# Patient Record
Sex: FEMALE | Race: WHITE | NOT HISPANIC OR LATINO | URBAN - METROPOLITAN AREA
[De-identification: names, ages, dates, MRNs, and addresses within clinical notes are randomized per-mention and may not be internally consistent; named-entity substitution may affect disease eponyms.]

---

## 2018-12-03 ENCOUNTER — INPATIENT (INPATIENT)
Facility: HOSPITAL | Age: 58
LOS: 9 days | Discharge: HOME HEALTH | End: 2018-12-13
Attending: INTERNAL MEDICINE | Admitting: INTERNAL MEDICINE

## 2018-12-03 VITALS
RESPIRATION RATE: 22 BRPM | OXYGEN SATURATION: 96 % | HEIGHT: 70 IN | TEMPERATURE: 100 F | SYSTOLIC BLOOD PRESSURE: 134 MMHG | HEART RATE: 74 BPM | WEIGHT: 251.99 LBS | DIASTOLIC BLOOD PRESSURE: 76 MMHG

## 2018-12-03 DIAGNOSIS — Z98.890 OTHER SPECIFIED POSTPROCEDURAL STATES: Chronic | ICD-10-CM

## 2018-12-03 DIAGNOSIS — R06.02 SHORTNESS OF BREATH: ICD-10-CM

## 2018-12-03 DIAGNOSIS — J18.9 PNEUMONIA, UNSPECIFIED ORGANISM: ICD-10-CM

## 2018-12-03 DIAGNOSIS — R07.89 OTHER CHEST PAIN: ICD-10-CM

## 2018-12-03 DIAGNOSIS — J13 PNEUMONIA DUE TO STREPTOCOCCUS PNEUMONIAE: ICD-10-CM

## 2018-12-03 LAB
ALBUMIN SERPL ELPH-MCNC: 4.2 G/DL — SIGNIFICANT CHANGE UP (ref 3.5–5.2)
ALP SERPL-CCNC: 65 U/L — SIGNIFICANT CHANGE UP (ref 30–115)
ALT FLD-CCNC: 17 U/L — SIGNIFICANT CHANGE UP (ref 0–41)
ANION GAP SERPL CALC-SCNC: 17 MMOL/L — HIGH (ref 7–14)
AST SERPL-CCNC: 20 U/L — SIGNIFICANT CHANGE UP (ref 0–41)
BASOPHILS # BLD AUTO: 0.04 K/UL — SIGNIFICANT CHANGE UP (ref 0–0.2)
BASOPHILS NFR BLD AUTO: 0.3 % — SIGNIFICANT CHANGE UP (ref 0–1)
BILIRUB SERPL-MCNC: 0.9 MG/DL — SIGNIFICANT CHANGE UP (ref 0.2–1.2)
BUN SERPL-MCNC: 9 MG/DL — LOW (ref 10–20)
CALCIUM SERPL-MCNC: 9.5 MG/DL — SIGNIFICANT CHANGE UP (ref 8.5–10.1)
CHLORIDE SERPL-SCNC: 100 MMOL/L — SIGNIFICANT CHANGE UP (ref 98–110)
CK SERPL-CCNC: 37 U/L — SIGNIFICANT CHANGE UP (ref 0–225)
CO2 SERPL-SCNC: 22 MMOL/L — SIGNIFICANT CHANGE UP (ref 17–32)
CREAT SERPL-MCNC: 0.8 MG/DL — SIGNIFICANT CHANGE UP (ref 0.7–1.5)
D DIMER BLD IA.RAPID-MCNC: 197 NG/ML DDU — SIGNIFICANT CHANGE UP (ref 0–230)
EOSINOPHIL # BLD AUTO: 0.04 K/UL — SIGNIFICANT CHANGE UP (ref 0–0.7)
EOSINOPHIL NFR BLD AUTO: 0.3 % — SIGNIFICANT CHANGE UP (ref 0–8)
FLU A RESULT: NEGATIVE — SIGNIFICANT CHANGE UP
FLU A RESULT: NEGATIVE — SIGNIFICANT CHANGE UP
FLUAV AG NPH QL: NEGATIVE — SIGNIFICANT CHANGE UP
FLUBV AG NPH QL: NEGATIVE — SIGNIFICANT CHANGE UP
GLUCOSE SERPL-MCNC: 156 MG/DL — HIGH (ref 70–99)
HCT VFR BLD CALC: 43.1 % — SIGNIFICANT CHANGE UP (ref 37–47)
HGB BLD-MCNC: 14.2 G/DL — SIGNIFICANT CHANGE UP (ref 12–16)
IMM GRANULOCYTES NFR BLD AUTO: 0.3 % — SIGNIFICANT CHANGE UP (ref 0.1–0.3)
LYMPHOCYTES # BLD AUTO: 1.36 K/UL — SIGNIFICANT CHANGE UP (ref 1.2–3.4)
LYMPHOCYTES # BLD AUTO: 9.5 % — LOW (ref 20.5–51.1)
MCHC RBC-ENTMCNC: 25.8 PG — LOW (ref 27–31)
MCHC RBC-ENTMCNC: 32.9 G/DL — SIGNIFICANT CHANGE UP (ref 32–37)
MCV RBC AUTO: 78.2 FL — LOW (ref 81–99)
MONOCYTES # BLD AUTO: 1.33 K/UL — HIGH (ref 0.1–0.6)
MONOCYTES NFR BLD AUTO: 9.2 % — SIGNIFICANT CHANGE UP (ref 1.7–9.3)
NEUTROPHILS # BLD AUTO: 11.57 K/UL — HIGH (ref 1.4–6.5)
NEUTROPHILS NFR BLD AUTO: 80.4 % — HIGH (ref 42.2–75.2)
NRBC # BLD: 0 /100 WBCS — SIGNIFICANT CHANGE UP (ref 0–0)
NT-PROBNP SERPL-SCNC: 105 PG/ML — SIGNIFICANT CHANGE UP (ref 0–300)
PLATELET # BLD AUTO: 243 K/UL — SIGNIFICANT CHANGE UP (ref 130–400)
POTASSIUM SERPL-MCNC: 4.2 MMOL/L — SIGNIFICANT CHANGE UP (ref 3.5–5)
POTASSIUM SERPL-SCNC: 4.2 MMOL/L — SIGNIFICANT CHANGE UP (ref 3.5–5)
PROT SERPL-MCNC: 7.3 G/DL — SIGNIFICANT CHANGE UP (ref 6–8)
RBC # BLD: 5.51 M/UL — HIGH (ref 4.2–5.4)
RBC # FLD: 13 % — SIGNIFICANT CHANGE UP (ref 11.5–14.5)
RSV RESULT: NEGATIVE — SIGNIFICANT CHANGE UP
RSV RNA RESP QL NAA+PROBE: NEGATIVE — SIGNIFICANT CHANGE UP
SODIUM SERPL-SCNC: 139 MMOL/L — SIGNIFICANT CHANGE UP (ref 135–146)
TROPONIN T SERPL-MCNC: <0.01 NG/ML — SIGNIFICANT CHANGE UP
TROPONIN T SERPL-MCNC: <0.01 NG/ML — SIGNIFICANT CHANGE UP
WBC # BLD: 14.38 K/UL — HIGH (ref 4.8–10.8)
WBC # FLD AUTO: 14.38 K/UL — HIGH (ref 4.8–10.8)

## 2018-12-03 RX ORDER — ASPIRIN/CALCIUM CARB/MAGNESIUM 324 MG
325 TABLET ORAL DAILY
Qty: 0 | Refills: 0 | Status: DISCONTINUED | OUTPATIENT
Start: 2018-12-03 | End: 2018-12-13

## 2018-12-03 RX ORDER — ENOXAPARIN SODIUM 100 MG/ML
40 INJECTION SUBCUTANEOUS DAILY
Qty: 0 | Refills: 0 | Status: DISCONTINUED | OUTPATIENT
Start: 2018-12-03 | End: 2018-12-13

## 2018-12-03 RX ORDER — ASPIRIN/CALCIUM CARB/MAGNESIUM 324 MG
325 TABLET ORAL ONCE
Qty: 0 | Refills: 0 | Status: COMPLETED | OUTPATIENT
Start: 2018-12-03 | End: 2018-12-03

## 2018-12-03 RX ADMIN — Medication 325 MILLIGRAM(S): at 13:04

## 2018-12-03 NOTE — ED PROVIDER NOTE - ATTENDING CONTRIBUTION TO CARE
57 yo f with pmh presents with DIALLO, sob for the past 3 days.  pt with mild symptoms over the past few weeks that acutely worsened over few days.  + mild cough.  no abd pain, no back pain, no headache, no leg pain, no leg swelling.  no dizziness.  awake, alert.  neck supple.  lungs clear.  abd soft, nontender.  no calf tenderness, no LE edema.  mmm.  a/p:  diallo.  p:  labs, ekg, cxr, likely admission to tele.

## 2018-12-03 NOTE — H&P ADULT - NSHPPHYSICALEXAM_GEN_ALL_CORE
Vital Signs Last 24 Hrs  T(C): 36.4 (12-03-18 @ 16:34)  T(F): 97.6 (12-03-18 @ 16:34), Max: 100.2 (12-03-18 @ 11:44)  HR: 56 (12-03-18 @ 16:34) (56 - 74)  BP: 128/67 (12-03-18 @ 16:34)  BP(mean): --  RR: 16 (12-03-18 @ 16:34) (16 - 22)  SpO2: 95% (12-03-18 @ 16:34) (95% - 96%)  Wt(kg): --

## 2018-12-03 NOTE — ED PROVIDER NOTE - OBJECTIVE STATEMENT
The patient is a 58y Female with no significant PMH presenting to ED with worsening shortness of breath x 3 days. Patient states she's noticed some shortness of breath with exertion over the past 3wks but within the last 3 days it has worsened where she cannot walk up one flight of stairs without becoming short of breath. She also notes chest tightness The patient is a 58y Female with no significant PMH presenting to ED with worsening shortness of breath x 3 days. Patient states she's noticed some shortness of breath with exertion over the past 3wks but within the last 3 days it has worsened where she cannot walk up one flight of stairs without becoming short of breath. She also notes chest tightness over the past 3 days with exertion. She endorses a cough and a fever yesterday of 101F. She has not taken any medications today. She denies abdominal pain, n/v/d, and urinary changes. The patient is a 58y Female with no significant PMH presenting to ED with worsening shortness of breath x 3 days. Patient states she's noticed some shortness of breath with exertion over the past 3wks but within the last 3 days it has worsened where she cannot walk up one flight of stairs without becoming short of breath. She also notes non-radiating chest tightness over the past 3 days with exertion. She endorses a semi-productive cough and a fever yesterday of 101F. She has not taken any medications today. She denies abdominal pain, n/v/d, urinary changes, recent travel, leg swelling, calf pain and hormone use. Patient states she has never had a stress test and/or ECHO. The patient is a 58y Female with no significant PMH presenting to ED with worsening shortness of breath x 3 days. Patient states she's noticed some shortness of breath with exertion over the past 3wks but within the last 3 days it has worsened where she cannot walk up one flight of stairs without becoming short of breath. She also notes non-radiating chest tightness over the past 3 days with exertion. She endorses a semi-productive cough and a fever yesterday of 101F. She has not taken any medications today. She denies abdominal pain, n/v/d, urinary changes, recent travel, leg swelling, calf pain and hormone use. Patient states she has never had a stress test and/or ECHO. FHx (+): Mom had MI with CABG at age 70.

## 2018-12-03 NOTE — ED PROVIDER NOTE - PHYSICAL EXAMINATION
GEN: Alert & Oriented x 3, No acute distress. Calm, appropriate.  Head and Neck: No cervical lymphadenopathy.   Eyes: PERRL. No conjunctival injection. No scleral icterus. Vision 20/20  RESP: Lungs clear to auscult bilat. no wheezes, rhonchi or rales. No retractions. Equal air entry. No tachypnea.   CARDIO: regular rate and rhythm, no murmurs, rubs or gallops. Normal S1, S2. Radial pulses 2+ bilaterally. No lower extremity edema.  ABD: Soft, slight distension. No rebound tenderness/guarding. No pulsatile mass. No tenderness with light and deep palpation x 4 quadrants  MS: no leg swelling. No calf tenderness.  SKIN: no rashes/lesions, no petechiae.  NEURO: CN II-XII grossly intact. Speech and cognition normal.

## 2018-12-03 NOTE — ED PROVIDER NOTE - PROGRESS NOTE DETAILS
supervised care of this patient Spoke with patient about xray results, lab results and need for admission. Patient agrees with plan and no questions at this time. Spoke with Dr. Siegel will admit to non-ccu tele.

## 2018-12-03 NOTE — ED PROVIDER NOTE - MEDICAL DECISION MAKING DETAILS
Pt with sob, cough, DIALLO.  CXR with opacity.  pt given abx, aspirin.  pt admitted to low risk [facility] for Chest pressure and DIALLO.  pt given abx for concern for pneumonia.

## 2018-12-03 NOTE — H&P ADULT - HISTORY OF PRESENT ILLNESS
The patient is a 58y Female with no significant PMH presenting to ED with worsening shortness of breath x 3 days. Patient states she's noticed some shortness of breath with exertion over the past 3wks but within the last 3 days it has worsened where she cannot walk up one flight of stairs without becoming short of breath. She also notes non-radiating chest tightness over the past 3 days with exertion. She endorses a semi-productive cough and a fever yesterday of 101F. She has not taken any medications today. She denies abdominal pain, n/v/d, urinary changes, recent travel, leg swelling, calf pain and hormone use. Patient states she has never had a stress test and/or ECHO. FHx (+): Mom had MI with CABG at age 70.

## 2018-12-03 NOTE — H&P ADULT - NSHPLABSRESULTS_GEN_ALL_CORE
14.2   14.38 )-----------( 243      ( 03 Dec 2018 12:32 )             43.1       12-03    139  |  100  |  9<L>  ----------------------------<  156<H>  4.2   |  22  |  0.8    Ca    9.5      03 Dec 2018 12:32    TPro  7.3  /  Alb  4.2  /  TBili  0.9  /  DBili  x   /  AST  20  /  ALT  17  /  AlkPhos  65  12-03                      Lactate Trend      CARDIAC MARKERS ( 03 Dec 2018 12:32 )  x     / <0.01 ng/mL / x     / x     / x            CAPILLARY BLOOD GLUCOSE

## 2018-12-03 NOTE — ED PROVIDER NOTE - NS ED ROS FT
GEN: (+) fever, (-)chills, (+) malaise  HEENT: (-) vision changes, (-) HA, (-) sore throat, (-) ear pain, (-) epistaxis , (-) tinnitus   CV: (+) chest tightness, (-) palpitations, (-) edema  PULM: (+) cough, (-) wheezing, (+) shortness of breath, (-) orthopnea, (-) hemoptysis   GI: (-) abdominal pain,(-) Nausea, (-) Vomiting, (-) Diarrhea, (-) Melena  NEURO: (-) weakness, (-) paresthesias, (-) syncope,  : (-) dysuria, (-) frequency, (-) urgency,  MS: (-) back pain, (-) joint pain, (-)myalgias,  SKIN: (-) rashes, (-) new lesions, (-) pruritus, (-) jaundice  HEME: (-) bleeding, (-) ecchymosis

## 2018-12-03 NOTE — ED PROVIDER NOTE - CARE PLAN
Principal Discharge DX:	Pneumonia  Secondary Diagnosis:	Chest tightness  Secondary Diagnosis:	Shortness of breath

## 2018-12-04 LAB
ANION GAP SERPL CALC-SCNC: 15 MMOL/L — HIGH (ref 7–14)
BUN SERPL-MCNC: 10 MG/DL — SIGNIFICANT CHANGE UP (ref 10–20)
CALCIUM SERPL-MCNC: 9.3 MG/DL — SIGNIFICANT CHANGE UP (ref 8.5–10.1)
CHLORIDE SERPL-SCNC: 101 MMOL/L — SIGNIFICANT CHANGE UP (ref 98–110)
CK MB CFR SERPL CALC: <1 NG/ML — SIGNIFICANT CHANGE UP (ref 0.6–6.3)
CK SERPL-CCNC: 25 U/L — SIGNIFICANT CHANGE UP (ref 0–225)
CO2 SERPL-SCNC: 23 MMOL/L — SIGNIFICANT CHANGE UP (ref 17–32)
CREAT SERPL-MCNC: 0.9 MG/DL — SIGNIFICANT CHANGE UP (ref 0.7–1.5)
GLUCOSE SERPL-MCNC: 176 MG/DL — HIGH (ref 70–99)
HCT VFR BLD CALC: 41.5 % — SIGNIFICANT CHANGE UP (ref 37–47)
HGB BLD-MCNC: 13.5 G/DL — SIGNIFICANT CHANGE UP (ref 12–16)
MCHC RBC-ENTMCNC: 25.6 PG — LOW (ref 27–31)
MCHC RBC-ENTMCNC: 32.5 G/DL — SIGNIFICANT CHANGE UP (ref 32–37)
MCV RBC AUTO: 78.6 FL — LOW (ref 81–99)
NRBC # BLD: 0 /100 WBCS — SIGNIFICANT CHANGE UP (ref 0–0)
PLATELET # BLD AUTO: 312 K/UL — SIGNIFICANT CHANGE UP (ref 130–400)
POTASSIUM SERPL-MCNC: 4.2 MMOL/L — SIGNIFICANT CHANGE UP (ref 3.5–5)
POTASSIUM SERPL-SCNC: 4.2 MMOL/L — SIGNIFICANT CHANGE UP (ref 3.5–5)
RBC # BLD: 5.28 M/UL — SIGNIFICANT CHANGE UP (ref 4.2–5.4)
RBC # FLD: 13.2 % — SIGNIFICANT CHANGE UP (ref 11.5–14.5)
SODIUM SERPL-SCNC: 139 MMOL/L — SIGNIFICANT CHANGE UP (ref 135–146)
TROPONIN T SERPL-MCNC: <0.01 NG/ML — SIGNIFICANT CHANGE UP
WBC # BLD: 13.98 K/UL — HIGH (ref 4.8–10.8)
WBC # FLD AUTO: 13.98 K/UL — HIGH (ref 4.8–10.8)

## 2018-12-04 RX ORDER — CHLORHEXIDINE GLUCONATE 213 G/1000ML
1 SOLUTION TOPICAL
Qty: 0 | Refills: 0 | Status: DISCONTINUED | OUTPATIENT
Start: 2018-12-04 | End: 2018-12-13

## 2018-12-04 RX ADMIN — Medication 600 MILLIGRAM(S): at 14:15

## 2018-12-04 RX ADMIN — Medication 325 MILLIGRAM(S): at 14:08

## 2018-12-05 RX ADMIN — ENOXAPARIN SODIUM 40 MILLIGRAM(S): 100 INJECTION SUBCUTANEOUS at 13:53

## 2018-12-05 RX ADMIN — Medication 30 MILLILITER(S): at 01:38

## 2018-12-05 RX ADMIN — Medication 325 MILLIGRAM(S): at 13:52

## 2018-12-05 RX ADMIN — Medication 600 MILLIGRAM(S): at 01:27

## 2018-12-05 RX ADMIN — Medication 600 MILLIGRAM(S): at 18:16

## 2018-12-05 RX ADMIN — Medication 30 MILLILITER(S): at 21:02

## 2018-12-05 NOTE — PROGRESS NOTE ADULT - SUBJECTIVE AND OBJECTIVE BOX
LUZ HUGHES  58y  Female    Patient is a 58y old  Female who presents with a chief complaint of 58 YEARS OLD FEMALE C/O SOB . (05 Dec 2018 06:28)    ALLERGIES:  penicillin (Rash)      INTERVAL HPI/OVERNIGHT EVENTS:    VITALS:  T(F): 96.6 (12-05-18 @ 14:03), Max: 99 (12-05-18 @ 05:26)  HR: 51 (12-05-18 @ 14:03) (51 - 70)  BP: 117/62 (12-05-18 @ 14:03) (114/58 - 129/77)  RR: 16 (12-05-18 @ 14:03) (16 - 17)  SpO2: 95% (12-05-18 @ 07:56) (95% - 95%)    LABS:  12-04    139  |  101  |  10  ----------------------------<  176<H>  4.2   |  23  |  0.9    Ca    9.3      04 Dec 2018 07:03      MICROBIOLOGY:    MEDICATION:  aluminum hydroxide/magnesium hydroxide/simethicone Suspension 30 milliLiter(s) Oral every 6 hours PRN  aspirin 325 milliGRAM(s) Oral daily  chlorhexidine 4% Liquid 1 Application(s) Topical <User Schedule> PRN  enoxaparin Injectable 40 milliGRAM(s) SubCutaneous daily  guaiFENesin  milliGRAM(s) Oral every 12 hours  levoFLOXacin IVPB 500 milliGRAM(s) IV Intermittent every 24 hours    RADIOLOGY & ADDITIONAL TESTS:

## 2018-12-05 NOTE — PROGRESS NOTE ADULT - ASSESSMENT
IMPRESSION:  59 yo female non smoker with no significant PMH presenting with a 3 week history of exertional dyspnea, recent onset of cough and fever to 101 with elevated WBC and bibasilar opacities on CXR   Findings consistent with community acquired pneumonia    PLAN:---------------------------------------------------------------  Monitor O2 sats  Continue Levaquin  Follow cultures  Check urine legionella antigen  DVT prophylaxis  OOB  Monitor labs  repeat CXR in 4 - 6 weeks unless indicated sooner based on clinical worssening  still hypoxic  pulmonary inputs appreciated

## 2018-12-06 LAB
HCT VFR BLD CALC: 40.2 % — SIGNIFICANT CHANGE UP (ref 37–47)
HGB BLD-MCNC: 13.3 G/DL — SIGNIFICANT CHANGE UP (ref 12–16)
LEGIONELLA AG UR QL: NEGATIVE — SIGNIFICANT CHANGE UP
MCHC RBC-ENTMCNC: 26.2 PG — LOW (ref 27–31)
MCHC RBC-ENTMCNC: 33.1 G/DL — SIGNIFICANT CHANGE UP (ref 32–37)
MCV RBC AUTO: 79.1 FL — LOW (ref 81–99)
NRBC # BLD: 0 /100 WBCS — SIGNIFICANT CHANGE UP (ref 0–0)
PLATELET # BLD AUTO: 341 K/UL — SIGNIFICANT CHANGE UP (ref 130–400)
RBC # BLD: 5.08 M/UL — SIGNIFICANT CHANGE UP (ref 4.2–5.4)
RBC # FLD: 13 % — SIGNIFICANT CHANGE UP (ref 11.5–14.5)
WBC # BLD: 10.69 K/UL — SIGNIFICANT CHANGE UP (ref 4.8–10.8)
WBC # FLD AUTO: 10.69 K/UL — SIGNIFICANT CHANGE UP (ref 4.8–10.8)

## 2018-12-06 RX ORDER — IPRATROPIUM/ALBUTEROL SULFATE 18-103MCG
3 AEROSOL WITH ADAPTER (GRAM) INHALATION EVERY 6 HOURS
Qty: 0 | Refills: 0 | Status: DISCONTINUED | OUTPATIENT
Start: 2018-12-06 | End: 2018-12-07

## 2018-12-06 RX ADMIN — ENOXAPARIN SODIUM 40 MILLIGRAM(S): 100 INJECTION SUBCUTANEOUS at 12:17

## 2018-12-06 RX ADMIN — Medication 600 MILLIGRAM(S): at 20:27

## 2018-12-06 RX ADMIN — Medication 3 MILLILITER(S): at 21:25

## 2018-12-06 RX ADMIN — Medication 325 MILLIGRAM(S): at 12:17

## 2018-12-06 RX ADMIN — Medication 600 MILLIGRAM(S): at 08:04

## 2018-12-06 NOTE — CONSULT NOTE ADULT - SUBJECTIVE AND OBJECTIVE BOX
Patient is a 58y old female who presents with a chief complaint of 58 YEARS OLD FEMALE C/O SOB . (03 Dec 2018 20:16)      HPI:  The patient is a 58y Female with no significant PMH presenting to ED with worsening shortness of breath x 3 days.   Patient states she's noticed some shortness of breath with exertion over the past 3wks but within the last 3 days it has worsened where she cannot walk up one flight of stairs without becoming short of breath. She also notes non-radiating chest tightness over the past 3 days with exertion. She endorses a semi-productive cough and a fever yesterday of 101F. She has not taken any medications today. She denies abdominal pain, n/v/d, urinary changes, recent travel, leg swelling, calf pain and hormone use. Patient states she has never had a stress test and/or ECHO. FHx (+): Mom had MI with CABG at age 70. (03 Dec 2018 20:16)    PAST MEDICAL & SURGICAL HISTORY:  No pertinent past medical history  History of dilatation and curettage    Smoking History:  Non smoker    Review of Systems:  Dyspnea, cough, minimal sputum, fever  No HA, sinus symptoms, sore throat  No myalgias, rashes, edema, orthopnea  No GI or  symptoms    Allergies:  penicillin (Rash)    MEDICATIONS  (STANDING):  aspirin 325 milliGRAM(s) Oral daily  enoxaparin Injectable 40 milliGRAM(s) SubCutaneous daily  guaiFENesin  milliGRAM(s) Oral every 12 hours  levoFLOXacin IVPB 500 milliGRAM(s) IV Intermittent every 24 hours    MEDICATIONS  (PRN):  aluminum hydroxide/magnesium hydroxide/simethicone Suspension 30 milliLiter(s) Oral every 6 hours PRN Dyspepsia  chlorhexidine 4% Liquid 1 Application(s) Topical <User Schedule> PRN infection control    PHYSICAL EXAM  Vital Signs Last 24 Hrs  T(C): 37.2 (05 Dec 2018 05:26), Max: 37.2 (05 Dec 2018 05:26)  T(F): 99 (05 Dec 2018 05:26), Max: 99 (05 Dec 2018 05:26)  HR: 70 (05 Dec 2018 05:26) (54 - 70)  BP: 129/77 (05 Dec 2018 05:26) (114/58 - 129/77)  BP(mean): --  RR: 16 (05 Dec 2018 05:26) (16 - 17)  SpO2: 94% (04 Dec 2018 07:46) (94% - 94%) on room air    Awake and alert NAD  Neck supple, no LN  S1 and S2 no murmur  Lungs bibasilar rales  Abdomen is soft and non tender  There is no edema  Neurologically non focal    LABS:                        13.5   13.98 )-----------( 312                  41.5                                              139  |  101  |  10  ----------------------------<  176<H>  4.2   |  23  |  0.9    Ca    9.3      04 Dec 2018 07:03    TPro  7.3  /  Alb  4.2  /  TBili  0.9  /  DBili  x   /  AST  20  /  ALT  17  /  AlkPhos  65  12-03                                       CARDIAC MARKERS ( 04 Dec 2018 07:03 ) x     / <0.01 ng/mL / 25 U/L / x     / <1.0 ng/mL  CARDIAC MARKERS ( 03 Dec 2018 21:52 ) x     / <0.01 ng/mL / 37 U/L / x     / x      CARDIAC MARKERS ( 03 Dec 2018 12:32 ) x     / <0.01 ng/mL / x     / x     / x        FLU A B RSV Detection by PCR (12.03.18 @ 14:21)    Flu A Result: Negative: LUZ Scott    Flu B Result: Negative: LUZ Scott    RSV Result: Negative: LUZ Scott    D-Dimer Assay, Quantitative (12.03.18 @ 14:21)    D-Dimer Assay, Quantitative: 197 ng/mL DDU    Serum Pro-Brain Natriuretic Peptide (12.03.18 @ 12:32)    Serum Pro-Brain Natriuretic Peptide: 105 pg/mL    RADIOGRAPHS:  < from: Xray Chest 2 Views PA/Lat (12.03.18 @ 12:19) >  Bibasilar opacities
LUZ HUGHES 58yFemalePatient is a 58y old  Female who presents with a chief complaint of 58 YEARS OLD FEMALE C/O SOB . (06 Dec 2018 09:17)      Patient has history of:  penicillin (Rash)        PNEUMONIA;CHEST TIGHTNESS;SHORTNESS OF BREATH  ^SOB  Handoff  MEWS Score  No pertinent past medical history  Pneumonia  Shortness of breath  Chest tightness  Pneumonia  History of dilatation and curettage  SOB  Shortness of breath  Chest tightness        Patient treated with:  levoFLOXacin IVPB 500 milliGRAM(s) IV Intermittent every 24 hours        PHYSICAL EXAM  T(F): 98 (12-06-18 @ 05:44), Max: 98 (12-06-18 @ 05:44)  HR: 75 (12-06-18 @ 05:44) (51 - 75)  BP: 155/96 (12-06-18 @ 05:44) (117/62 - 155/96)  RR: 16 (12-06-18 @ 05:44) (16 - 16)  SpO2: 95% (12-05-18 @ 20:43) (95% - 95%)  Daily Height in cm: 177.8 (05 Dec 2018 14:21)    Daily   HEENT: normal, no nuchal rigidity  Cor: RSR Nl S1 S2  Lungs: clear  Decreased breath sounds at bases    Abdomen: Nontender, Nl BS,     Ext: No clubbing,cyanosis or edema    LAB & RADIOLOGIC RESULTS:

## 2018-12-06 NOTE — PROGRESS NOTE ADULT - SUBJECTIVE AND OBJECTIVE BOX
Interval history and ROS:    No significant improvement in cough and dyspnea    MEDICATIONS:  aluminum hydroxide/magnesium hydroxide/simethicone Suspension 30 milliLiter(s) Oral every 6 hours PRN  aspirin 325 milliGRAM(s) Oral daily  chlorhexidine 4% Liquid 1 Application(s) Topical <User Schedule> PRN  enoxaparin Injectable 40 milliGRAM(s) SubCutaneous daily  guaiFENesin  milliGRAM(s) Oral every 12 hours  levoFLOXacin IVPB 500 milliGRAM(s) IV Intermittent every 24 hours      VITAL SIGNS:  Vital Signs Last 24 Hrs  T(C): 36.7 (06 Dec 2018 05:44), Max: 36.7 (06 Dec 2018 05:44)  T(F): 98 (06 Dec 2018 05:44), Max: 98 (06 Dec 2018 05:44)  HR: 75 (06 Dec 2018 05:44) (51 - 75)  BP: 155/96 (06 Dec 2018 05:44) (117/62 - 155/96)  BP(mean): --  RR: 16 (06 Dec 2018 05:44) (16 - 16)  SpO2: 95% (05 Dec 2018 20:43) (95% - 95%)        PHYSICAL EXAM:  Awake and alert NAD  Neck supple, no LN  S1 and S2 no murmur  Lungs decreased BS  Abdomen is soft and non tender  There is no edema  Neurologically non focal

## 2018-12-06 NOTE — PROGRESS NOTE ADULT - SUBJECTIVE AND OBJECTIVE BOX
LUZ HUGHES  58y  Female    Patient is a 58y old  Female who presents with a chief complaint of 58 YEARS OLD FEMALE C/O SOB . (06 Dec 2018 11:51)    ALLERGIES:  penicillin (Rash)      INTERVAL HPI/OVERNIGHT EVENTS:    VITALS:  T(F): 95.7 (12-06-18 @ 14:32), Max: 98 (12-06-18 @ 05:44)  HR: 55 (12-06-18 @ 14:32) (55 - 75)  BP: 118/57 (12-06-18 @ 14:32) (118/57 - 155/96)  RR: 16 (12-06-18 @ 14:32) (16 - 16)  SpO2: 94% (12-06-18 @ 12:17) (94% - 95%)    LABS:        MICROBIOLOGY:    MEDICATION:  aluminum hydroxide/magnesium hydroxide/simethicone Suspension 30 milliLiter(s) Oral every 6 hours PRN  aspirin 325 milliGRAM(s) Oral daily  chlorhexidine 4% Liquid 1 Application(s) Topical <User Schedule> PRN  enoxaparin Injectable 40 milliGRAM(s) SubCutaneous daily  guaiFENesin  milliGRAM(s) Oral every 12 hours  levoFLOXacin IVPB 500 milliGRAM(s) IV Intermittent every 24 hours    RADIOLOGY & ADDITIONAL TESTS:

## 2018-12-06 NOTE — PROGRESS NOTE ADULT - ASSESSMENT
IMPRESSION  Pt with pneumonia (bibasilar opactities on Cxray)    FLU & RVP negative; R/O Legionella    On levoFLOXacin IVPB 500 milliGRAM(s) IV Intermittent every 24 hours    Patient has history of:  penicillin (Rash)    Pt with SIRS (fever, leukocytosis)  plan--------------------------------------------------------------  Await Urine Legionella antigen  Continue IV Levaquin  Repeat wbc

## 2018-12-06 NOTE — CONSULT NOTE ADULT - ASSESSMENT
IMPRESSION:  57 yo female non smoker with no significant PMH presenting with a 3 week history of exertional dyspnea, recent onset of cough and fever to 101 with elevated WBC and bibasilar opacities on CXR   Findings consistent with community acquired pneumonia    PLAN:  Monitor O2 sats  Continue Levaquin  Follow cultures  Check urine legionella antigen  DVT prophylaxis  OOB  Monitor labs  repeat CXR in 4 - 6 weeks unless indicated sooner based on clinical worssening
IMPRESSION  Pt with pneumonia (bibasilar opactities on Cxray)    FLU & RVP negative; R/O Legionella    On levoFLOXacin IVPB 500 milliGRAM(s) IV Intermittent every 24 hours    Patient has history of:  penicillin (Rash)    Pt with SIRS (fever, leukocytosis)    SUGGESTIONs  Await Urine Legionella antigen  Continue IV Levaquin  Repeat wbc

## 2018-12-06 NOTE — PROGRESS NOTE ADULT - ASSESSMENT
IMPRESSION:  59 yo female non smoker with no significant PMH presenting with a 3 week history of exertional dyspnea, recent onset of cough and fever to 101 with elevated WBC and bibasilar opacities on CXR   Findings consistent with community acquired pneumonia    PLAN:  Monitor O2 sats  Continue Levaquin  Follow cultures  Follow urine legionella antigen  DVT prophylaxis  OOB  Monitor labs  Repeat CXR in 4 - 6 weeks unless indicated sooner based on clinical worsening

## 2018-12-07 RX ORDER — AZITHROMYCIN 500 MG/1
500 TABLET, FILM COATED ORAL EVERY 24 HOURS
Qty: 0 | Refills: 0 | Status: DISCONTINUED | OUTPATIENT
Start: 2018-12-08 | End: 2018-12-08

## 2018-12-07 RX ORDER — IPRATROPIUM/ALBUTEROL SULFATE 18-103MCG
3 AEROSOL WITH ADAPTER (GRAM) INHALATION EVERY 6 HOURS
Qty: 0 | Refills: 0 | Status: DISCONTINUED | OUTPATIENT
Start: 2018-12-07 | End: 2018-12-13

## 2018-12-07 RX ORDER — AZITHROMYCIN 500 MG/1
500 TABLET, FILM COATED ORAL ONCE
Qty: 0 | Refills: 0 | Status: COMPLETED | OUTPATIENT
Start: 2018-12-07 | End: 2018-12-07

## 2018-12-07 RX ORDER — AZITHROMYCIN 500 MG/1
TABLET, FILM COATED ORAL
Qty: 0 | Refills: 0 | Status: DISCONTINUED | OUTPATIENT
Start: 2018-12-07 | End: 2018-12-08

## 2018-12-07 RX ADMIN — Medication 325 MILLIGRAM(S): at 13:26

## 2018-12-07 RX ADMIN — ENOXAPARIN SODIUM 40 MILLIGRAM(S): 100 INJECTION SUBCUTANEOUS at 13:27

## 2018-12-07 RX ADMIN — Medication 3 MILLILITER(S): at 14:16

## 2018-12-07 RX ADMIN — Medication 600 MILLIGRAM(S): at 21:31

## 2018-12-07 RX ADMIN — Medication 3 MILLILITER(S): at 07:34

## 2018-12-07 RX ADMIN — Medication 600 MILLIGRAM(S): at 09:14

## 2018-12-07 RX ADMIN — Medication 3 MILLILITER(S): at 20:03

## 2018-12-07 RX ADMIN — AZITHROMYCIN 255 MILLIGRAM(S): 500 TABLET, FILM COATED ORAL at 21:31

## 2018-12-07 NOTE — CHART NOTE - NSCHARTNOTEFT_GEN_A_CORE
[facility]    Notified by RN for patient  LUZ HUGHES  58y  Female    Requesting test Results of CT Chest.  Patient also has continued complaint of dyspnea.      T(C): 36.5 (12-07-18 @ 15:33), Max: 36.6 (12-07-18 @ 13:19)  HR: 80 (12-07-18 @ 15:33) (59 - 80)  BP: 121/59 (12-07-18 @ 15:33) (121/59 - 132/68)  RR: 16 (12-07-18 @ 15:33) (16 - 16)  SpO2: 94% on 4 LPM via N/C        Medications:  ALBUTerol/ipratropium for Nebulization 3 milliLiter(s) every 6 hours  aspirin 325 milliGRAM(s) daily  enoxaparin Injectable 40 milliGRAM(s) daily  guaiFENesin  milliGRAM(s) every 12 hours  levoFLOXacin IVPB 500 milliGRAM(s) every 24 hours        PHYSICAL EXAM:    NERVOUS SYSTEM:  Alert & Oriented X3,  PULMONARY: B/L L/L ronchi  CARDIOVASCULAR: Regular rate and rhythm; No murmurs, rubs, or gallops  GI: Soft, Nontender, Nondistended; Bowel sounds present  EXTREMITIES:  2+ Peripheral Pulses, No clubbing, cyanosis, or edema  SKIN: warm and dry    Assesment/Plan:    Urine antigen test ordered as per ID request Dr Agudelo  Advised RN to increase O2 as needed and monitor Pulse Ox  Case D/W Dr Janak Siegel and advised of above, awaiting ID recommendation  and Pulmonary eval of CT in am.  Sputum cultures also pending collection.                Discussed with Provider:

## 2018-12-07 NOTE — PROGRESS NOTE ADULT - ASSESSMENT
Pt with pneumonia (bibasilar opactities on Cxray)    FLU & RVP negative; R/O Legionella    On levoFLOXacin IVPB 500 milliGRAM(s) IV Intermittent every 24 hours    Patient has history of:  penicillin (Rash)    Tmax 97.9 with wbc 10.69  Urinary legionella antigen negative    Pt with SIRS (fever, leukocytosis)    SUGGESTIONs  Continue IV Levaquin. Ultimate switch to PO Levaquin Pt with pneumonia (bibasilar opactities on Cxray)    FLU & RVP negative; R/O Legionella    On levoFLOXacin IVPB 500 milliGRAM(s) IV Intermittent every 24 hours    Patient has history of:  penicillin (Rash) nonspecific    Tmax 97.9 with wbc 10.69  Urinary legionella antigen negative    Pt with SIRS (fever, leukocytosis)    SUGGESTIONs  Continue IV Levaquin  Await CT results

## 2018-12-07 NOTE — PROGRESS NOTE ADULT - ASSESSMENT
IMPRESSION:  59 yo female non smoker with no significant PMH presenting with a 3 week history of exertional dyspnea, recent onset of cough and fever to 101 with elevated WBC and bibasilar opacities on CXR   Findings consistent with community acquired pneumonia    PLAN:  Monitor O2 sats, continue O2 as required to maintain saturation > 90%  Continue Levaquin  DVT prophylaxis  OOB  Monitor labs  Repeat CXR in 4 - 6 weeks unless indicated sooner based on clinical worsening

## 2018-12-07 NOTE — PROGRESS NOTE ADULT - SUBJECTIVE AND OBJECTIVE BOX
Interval history and ROS:    Still with cough and DIALLO  Requiring O2    MEDICATIONS:  ALBUTerol/ipratropium for Nebulization 3 milliLiter(s) Nebulizer every 6 hours  aluminum hydroxide/magnesium hydroxide/simethicone Suspension 30 milliLiter(s) Oral every 6 hours PRN  aspirin 325 milliGRAM(s) Oral daily  chlorhexidine 4% Liquid 1 Application(s) Topical <User Schedule> PRN  enoxaparin Injectable 40 milliGRAM(s) SubCutaneous daily  guaiFENesin  milliGRAM(s) Oral every 12 hours  levoFLOXacin IVPB 500 milliGRAM(s) IV Intermittent every 24 hours      VITAL SIGNS:  Vital Signs Last 24 Hrs  T(C): 36.4 (07 Dec 2018 05:47), Max: 36.4 (06 Dec 2018 22:04)  T(F): 97.5 (07 Dec 2018 05:47), Max: 97.5 (06 Dec 2018 22:04)  HR: 63 (07 Dec 2018 05:47) (55 - 77)  BP: 130/63 (07 Dec 2018 05:47) (118/57 - 132/68)  BP(mean): --  RR: 16 (07 Dec 2018 05:47) (16 - 16)  SpO2: 94% (06 Dec 2018 14:50) (94% - 94%) on NC O2        PHYSICAL EXAM:  Awake and alert NAD  Neck supple, no LN  S1 and S2 no murmur  Lungs bibasilar crackles  Abdomen is soft and non tender  There is no edema  Neurologically non focal        LABS:                        13.3   10.69 )-----------( 341      ( 06 Dec 2018 19:18 )             40.2     Legionella pneumophila Antigen, Urine (12.05.18 @ 10:00)    Legionella Antigen, Urine: Negative

## 2018-12-07 NOTE — PROGRESS NOTE ADULT - SUBJECTIVE AND OBJECTIVE BOX
infectious diseases progress note:  LUZ HUGHES is a 58yFemale patient    PNEUMONIA;CHEST TIGHTNESS;SHORTNESS OF BREATH    Shortness of breath  Chest tightness  Pneumonia      ROS:  CONSTITUTIONAL:  Negative fever or chills, feels well, good appetite  EYES:  Negative  blurry vision or double vision  CARDIOVASCULAR:  Negative for chest pain or palpitations  RESPIRATORY:  Negative for cough, wheezing, or SOB   GASTROINTESTINAL:  Negative for nausea, vomiting, diarrhea, constipation, or abdominal pain  GENITOURINARY:  Negative frequency, urgency or dysuria  NEUROLOGIC:  No headache, confusion, dizziness, lightheadedness    Allergies    penicillin (Rash)    Intolerances        ANTIBIOTICS/RELEVANT:  antimicrobials  levoFLOXacin IVPB 500 milliGRAM(s) IV Intermittent every 24 hours    immunologic:    OTHER:  ALBUTerol/ipratropium for Nebulization 3 milliLiter(s) Nebulizer every 6 hours  aluminum hydroxide/magnesium hydroxide/simethicone Suspension 30 milliLiter(s) Oral every 6 hours PRN  aspirin 325 milliGRAM(s) Oral daily  chlorhexidine 4% Liquid 1 Application(s) Topical <User Schedule> PRN  enoxaparin Injectable 40 milliGRAM(s) SubCutaneous daily  guaiFENesin  milliGRAM(s) Oral every 12 hours      Objective:  T(F): 97.8 (12-07-18 @ 13:19), Max: 97.8 (12-07-18 @ 13:19)  HR: 59 (12-07-18 @ 13:19) (55 - 77)  BP: 131/77 (12-07-18 @ 13:19) (118/57 - 132/68)  RR: 16 (12-07-18 @ 13:19) (16 - 16)  SpO2: 94% (12-06-18 @ 14:50) (94% - 94%)    PHYSICAL EXAM  Constitutional:Well-developed, well nourished  Eyes:AMY, EOMI  Ear/Nose/Throat: no oral lesion, no sinus tenderness on percussion	  Neck:no JVD, no lymphadenopathy, supple  Respiratory: CTA manas  Cardiovascular: S1S2 RRR, no murmurs  Gastrointestinal:soft, (+) BS, no HSM  Extremities:no e/e/c                                  13.3   10.69 )-----------( 341      ( 06 Dec 2018 19:18 )             40.2

## 2018-12-07 NOTE — PROGRESS NOTE ADULT - SUBJECTIVE AND OBJECTIVE BOX
LUZ HUGHES  58y  Female    Patient is a 58y old  Female who presents with a chief complaint of 58 YEARS OLD FEMALE C/O SOB . (07 Dec 2018 06:56)    ALLERGIES:  penicillin (Rash)      INTERVAL HPI/OVERNIGHT EVENTS:    VITALS:  T(F): 97.5 (12-07-18 @ 05:47), Max: 97.5 (12-06-18 @ 22:04)  HR: 63 (12-07-18 @ 05:47) (55 - 77)  BP: 130/63 (12-07-18 @ 05:47) (118/57 - 132/68)  RR: 16 (12-07-18 @ 05:47) (16 - 16)  SpO2: 94% (12-06-18 @ 14:50) (94% - 94%)    LABS:        MICROBIOLOGY:    MEDICATION:  ALBUTerol/ipratropium for Nebulization 3 milliLiter(s) Nebulizer every 6 hours  aluminum hydroxide/magnesium hydroxide/simethicone Suspension 30 milliLiter(s) Oral every 6 hours PRN  aspirin 325 milliGRAM(s) Oral daily  chlorhexidine 4% Liquid 1 Application(s) Topical <User Schedule> PRN  enoxaparin Injectable 40 milliGRAM(s) SubCutaneous daily  guaiFENesin  milliGRAM(s) Oral every 12 hours  levoFLOXacin IVPB 500 milliGRAM(s) IV Intermittent every 24 hours    RADIOLOGY & ADDITIONAL TESTS:

## 2018-12-08 LAB
ANION GAP SERPL CALC-SCNC: 15 MMOL/L — HIGH (ref 7–14)
BASE EXCESS BLDA CALC-SCNC: 3.2 MMOL/L — HIGH (ref -2–2)
BUN SERPL-MCNC: 9 MG/DL — LOW (ref 10–20)
CALCIUM SERPL-MCNC: 9.3 MG/DL — SIGNIFICANT CHANGE UP (ref 8.5–10.1)
CHLORIDE SERPL-SCNC: 101 MMOL/L — SIGNIFICANT CHANGE UP (ref 98–110)
CO2 SERPL-SCNC: 24 MMOL/L — SIGNIFICANT CHANGE UP (ref 17–32)
CREAT SERPL-MCNC: 0.7 MG/DL — SIGNIFICANT CHANGE UP (ref 0.7–1.5)
ERYTHROCYTE [SEDIMENTATION RATE] IN BLOOD: 70 MM/HR — HIGH (ref 0–20)
GLUCOSE SERPL-MCNC: 129 MG/DL — HIGH (ref 70–99)
HCO3 BLDA-SCNC: 26 MMOL/L — SIGNIFICANT CHANGE UP (ref 23–27)
HCT VFR BLD CALC: 38 % — SIGNIFICANT CHANGE UP (ref 37–47)
HGB BLD-MCNC: 12.4 G/DL — SIGNIFICANT CHANGE UP (ref 12–16)
HOROWITZ INDEX BLDA+IHG-RTO: 40 — SIGNIFICANT CHANGE UP
LEGIONELLA AG UR QL: NEGATIVE — SIGNIFICANT CHANGE UP
MCHC RBC-ENTMCNC: 25.8 PG — LOW (ref 27–31)
MCHC RBC-ENTMCNC: 32.6 G/DL — SIGNIFICANT CHANGE UP (ref 32–37)
MCV RBC AUTO: 79 FL — LOW (ref 81–99)
NRBC # BLD: 0 /100 WBCS — SIGNIFICANT CHANGE UP (ref 0–0)
PCO2 BLDA: 34 MMHG — LOW (ref 38–42)
PH BLDA: 7.49 — HIGH (ref 7.38–7.42)
PLATELET # BLD AUTO: 317 K/UL — SIGNIFICANT CHANGE UP (ref 130–400)
PO2 BLDA: 69 MMHG — LOW (ref 78–95)
POTASSIUM SERPL-MCNC: 4.4 MMOL/L — SIGNIFICANT CHANGE UP (ref 3.5–5)
POTASSIUM SERPL-SCNC: 4.4 MMOL/L — SIGNIFICANT CHANGE UP (ref 3.5–5)
RBC # BLD: 4.81 M/UL — SIGNIFICANT CHANGE UP (ref 4.2–5.4)
RBC # FLD: 13.2 % — SIGNIFICANT CHANGE UP (ref 11.5–14.5)
SAO2 % BLDA: 95 % — SIGNIFICANT CHANGE UP (ref 94–98)
SODIUM SERPL-SCNC: 140 MMOL/L — SIGNIFICANT CHANGE UP (ref 135–146)
WBC # BLD: 9.24 K/UL — SIGNIFICANT CHANGE UP (ref 4.8–10.8)
WBC # FLD AUTO: 9.24 K/UL — SIGNIFICANT CHANGE UP (ref 4.8–10.8)

## 2018-12-08 RX ORDER — CEFTRIAXONE 500 MG/1
INJECTION, POWDER, FOR SOLUTION INTRAMUSCULAR; INTRAVENOUS
Qty: 0 | Refills: 0 | Status: DISCONTINUED | OUTPATIENT
Start: 2018-12-08 | End: 2018-12-08

## 2018-12-08 RX ORDER — CEFTRIAXONE 500 MG/1
2 INJECTION, POWDER, FOR SOLUTION INTRAMUSCULAR; INTRAVENOUS EVERY 24 HOURS
Qty: 0 | Refills: 0 | Status: DISCONTINUED | OUTPATIENT
Start: 2018-12-09 | End: 2018-12-11

## 2018-12-08 RX ORDER — CEFTRIAXONE 500 MG/1
2 INJECTION, POWDER, FOR SOLUTION INTRAMUSCULAR; INTRAVENOUS ONCE
Qty: 0 | Refills: 0 | Status: COMPLETED | OUTPATIENT
Start: 2018-12-08 | End: 2018-12-08

## 2018-12-08 RX ADMIN — Medication 325 MILLIGRAM(S): at 14:07

## 2018-12-08 RX ADMIN — Medication 600 MILLIGRAM(S): at 09:12

## 2018-12-08 RX ADMIN — Medication 3 MILLILITER(S): at 19:35

## 2018-12-08 RX ADMIN — Medication 5 MILLIGRAM(S): at 21:29

## 2018-12-08 RX ADMIN — Medication 3 MILLILITER(S): at 13:42

## 2018-12-08 RX ADMIN — Medication 3 MILLILITER(S): at 08:13

## 2018-12-08 RX ADMIN — Medication 600 MILLIGRAM(S): at 21:29

## 2018-12-08 RX ADMIN — ENOXAPARIN SODIUM 40 MILLIGRAM(S): 100 INJECTION SUBCUTANEOUS at 14:07

## 2018-12-08 RX ADMIN — CEFTRIAXONE 100 GRAM(S): 500 INJECTION, POWDER, FOR SOLUTION INTRAMUSCULAR; INTRAVENOUS at 13:57

## 2018-12-08 RX ADMIN — Medication 50 MILLIGRAM(S): at 18:47

## 2018-12-08 NOTE — PROGRESS NOTE ADULT - ASSESSMENT
Pt with pneumonia (bibasilar opactities on Cxray)    FLU & RVP negative; R/O Legionella    On levoFLOXacin IVPB 500 milliGRAM(s) IV Intermittent every 24 hours  azithromycin  IVPB 500 milliGRAM(s) IV Intermittent every 24 hours    Patient has history of:  penicillin (Rash) nonspecific    Tmax 97.9 with wbc 9.24  Urinary legionella antigen negative    Pt with SIRS (fever, leukocytosis)    CT chest with 1.  No pulmonary embolus.   2.  Multifocal groundglass and consolidative opacities greatest in the   right lower lobe and left lower lobe consistent with an acute   infectious/inflammatory process.     SUGGESTIONs  Continue IV Levaquin/Zithromax  Await urine pneumococcal antigen  Await CT results Pt with pneumonia (bibasilar opactities on Cxray)    FLU & RVP negative; R/O Legionella    On levoFLOXacin IVPB 500 milliGRAM(s) IV Intermittent every 24 hours  azithromycin  IVPB 500 milliGRAM(s) IV Intermittent every 24 hours    Patient has history of:  penicillin (Rash) nonspecific    Tmax 97.9 with wbc 9.24  Urinary legionella antigen negative    Pt with SIRS (fever, leukocytosis)    CT chest with 1.  No pulmonary embolus.   2.  Multifocal groundglass and consolidative opacities greatest in the   right lower lobe and left lower lobe consistent with an acute   infectious/inflammatory process.     SUGGESTIONs  Continue IV Levaquin  Rocephin 2gm IVPB q24h  D/C zithromax  Await urine pneumococcal antigen

## 2018-12-08 NOTE — PROGRESS NOTE ADULT - ASSESSMENT
acute respiratory failure, hypoxic  pneumonia vs inflamation       oxygen per pulse oximetry  bronchodilators  antibiotics per id, temperature and wbc improved, follow up sputum culture  infectious dis start on Rocephin  will give one dose of  prednisone 40mg

## 2018-12-08 NOTE — PROGRESS NOTE ADULT - SUBJECTIVE AND OBJECTIVE BOX
LUZ HUGHES  58y, Female  Allergy: penicillin (Rash)      LAST 24-Hr EVENTS:  still complains of dyspnea on exertion, cough with scanty sputum  VITALS:  T(F): 97.6 (12-08-18 @ 05:38), Max: 97.8 (12-07-18 @ 13:19)  HR: 59 (12-08-18 @ 05:38)  BP: 124/63 (12-08-18 @ 05:38) (121/59 - 133/65)  RR: 16 (12-08-18 @ 05:38)  SpO2: --    PHYSICAL EXAM:    GENERAL: NAD, well-developed  HEAD:  Atraumatic, Normocephalic  NECK: Supple, No JVD  CHEST/LUNG: Clear to auscultation bilaterally; No wheeze  HEART: Regular rate and rhythm; No murmurs, rubs, or gallops  ABDOMEN: Soft, Nontender, Nondistended; Bowel sounds present  EXTREMITIES:  2+ Peripheral Pulses, No clubbing, cyanosis, or edema        TESTS & MEASUREMENTS:                          12.4   9.24  )-----------( 317      ( 08 Dec 2018 07:59 )             38.0                         ABG & VENT SETTINGS: (when applicable)  n/a      RADIOLOGY & ADDITIONAL TESTS:  < from: CT Chest w/ IV Cont (12.07.18 @ 13:04) >    IMPRESSION:  1.  No pulmonary embolus.   2.  Multifocal groundglass and consolidative opacities greatest in the   right lower lobe and left lower lobe consistent with an acute   infectious/inflammatory process.       < end of copied text >    MEDICATIONS:  MEDICATIONS  (STANDING):  ALBUTerol/ipratropium for Nebulization 3 milliLiter(s) Nebulizer every 6 hours  aspirin 325 milliGRAM(s) Oral daily  azithromycin  IVPB 500 milliGRAM(s) IV Intermittent every 24 hours  azithromycin  IVPB      enoxaparin Injectable 40 milliGRAM(s) SubCutaneous daily  guaiFENesin  milliGRAM(s) Oral every 12 hours  levoFLOXacin IVPB 500 milliGRAM(s) IV Intermittent every 24 hours    MEDICATIONS  (PRN):  aluminum hydroxide/magnesium hydroxide/simethicone Suspension 30 milliLiter(s) Oral every 6 hours PRN Dyspepsia  chlorhexidine 4% Liquid 1 Application(s) Topical <User Schedule> PRN infection control

## 2018-12-08 NOTE — PROGRESS NOTE ADULT - SUBJECTIVE AND OBJECTIVE BOX
infectious diseases progress note:  LUZ HUGHES is a 58yFemale patient    PNEUMONIA;CHEST TIGHTNESS;SHORTNESS OF BREATH    Shortness of breath  Chest tightness  Pneumonia      ROS:  CONSTITUTIONAL:  Negative fever or chills, feels well, good appetite  EYES:  Negative  blurry vision or double vision  CARDIOVASCULAR:  Negative for chest pain or palpitations  RESPIRATORY:  Negative for cough, wheezing, or SOB   GASTROINTESTINAL:  Negative for nausea, vomiting, diarrhea, constipation, or abdominal pain  GENITOURINARY:  Negative frequency, urgency or dysuria  NEUROLOGIC:  No headache, confusion, dizziness, lightheadedness    Allergies    penicillin (Rash)    Intolerances        ANTIBIOTICS/RELEVANT:  antimicrobials  azithromycin  IVPB 500 milliGRAM(s) IV Intermittent every 24 hours  azithromycin  IVPB      levoFLOXacin IVPB 500 milliGRAM(s) IV Intermittent every 24 hours    immunologic:    OTHER:  ALBUTerol/ipratropium for Nebulization 3 milliLiter(s) Nebulizer every 6 hours  aluminum hydroxide/magnesium hydroxide/simethicone Suspension 30 milliLiter(s) Oral every 6 hours PRN  aspirin 325 milliGRAM(s) Oral daily  chlorhexidine 4% Liquid 1 Application(s) Topical <User Schedule> PRN  enoxaparin Injectable 40 milliGRAM(s) SubCutaneous daily  guaiFENesin  milliGRAM(s) Oral every 12 hours      Objective:  T(F): 97.6 (12-08-18 @ 05:38), Max: 97.8 (12-07-18 @ 13:19)  HR: 59 (12-08-18 @ 05:38) (59 - 80)  BP: 124/63 (12-08-18 @ 05:38) (121/59 - 133/65)  RR: 16 (12-08-18 @ 05:38) (16 - 16)  SpO2: --    PHYSICAL EXAM  Eyes:AMY, EOMI  Ear/Nose/Throat: no oral lesion, no sinus tenderness on percussion	  Neck:no JVD, no lymphadenopathy, supple  Respiratory: CTA manas  Cardiovascular: S1S2 RRR, no murmurs  Gastrointestinal:soft, (+) BS, no HSM  Extremities:no e/e/c    12-08    140  |  101  |  9<L>  ----------------------------<  129<H>  4.4   |  24  |  0.7        <--<9>>7.49                          12.4   9.24  )-----------( 317      ( 08 Dec 2018 07:59 )             38.0

## 2018-12-08 NOTE — PROGRESS NOTE ADULT - SUBJECTIVE AND OBJECTIVE BOX
LUZ HUGHES  58y  Female    Patient is a 58y old  Female who presents with a chief complaint of 58 YEARS OLD FEMALE C/O SOB . (08 Dec 2018 12:20)    ALLERGIES:  penicillin (Rash)      INTERVAL HPI/OVERNIGHT EVENTS:    VITALS:  T(F): 96.4 (12-08-18 @ 14:04), Max: 97.6 (12-08-18 @ 05:38)  HR: 77 (12-08-18 @ 14:04) (59 - 77)  BP: 134/72 (12-08-18 @ 14:04) (124/63 - 134/72)  RR: 16 (12-08-18 @ 14:04) (16 - 16)  SpO2: --    LABS:  12-08    140  |  101  |  9<L>  ----------------------------<  129<H>  4.4   |  24  |  0.7    Ca    9.3      08 Dec 2018 07:59      LABS:                        12.4   9.24  )-----------( 317      ( 08 Dec 2018 07:59 )             38.0     12-08    140  |  101  |  9<L>  ----------------------------<  129<H>  4.4   |  24  |  0.7    Ca    9.3      08 Dec 2018 07:59          CAPILLARY BLOOD GLUCOSE          ABG - ( 08 Dec 2018 09:50 )  pH, Arterial: 7.49  pH, Blood: x     /  pCO2: 34    /  pO2: 69    / HCO3: 26    / Base Excess: 3.2   /  SaO2: 95                    Echo:    RADIOLOGY & ADDITIONAL TESTS:    	  MICROBIOLOGY:    MEDICATION:  ALBUTerol/ipratropium for Nebulization 3 milliLiter(s) Nebulizer every 6 hours  aluminum hydroxide/magnesium hydroxide/simethicone Suspension 30 milliLiter(s) Oral every 6 hours PRN  aspirin 325 milliGRAM(s) Oral daily  bisacodyl 5 milliGRAM(s) Oral once  chlorhexidine 4% Liquid 1 Application(s) Topical <User Schedule> PRN  enoxaparin Injectable 40 milliGRAM(s) SubCutaneous daily  guaiFENesin  milliGRAM(s) Oral every 12 hours  levoFLOXacin IVPB 500 milliGRAM(s) IV Intermittent every 24 hours    RADIOLOGY & ADDITIONAL TESTS:

## 2018-12-08 NOTE — PROGRESS NOTE ADULT - ASSESSMENT
acute respiratory failure, hypoxic  pneumonia      oxygen per pulse oximetry  bronchodilators  antibiotics per id, temperature and wbc improved, follow up sputum culture  out of bed/dvt prophylaxis

## 2018-12-09 LAB
GRAM STN FLD: SIGNIFICANT CHANGE UP
SPECIMEN SOURCE: SIGNIFICANT CHANGE UP

## 2018-12-09 RX ADMIN — Medication 600 MILLIGRAM(S): at 11:10

## 2018-12-09 RX ADMIN — Medication 600 MILLIGRAM(S): at 21:08

## 2018-12-09 RX ADMIN — CEFTRIAXONE 100 GRAM(S): 500 INJECTION, POWDER, FOR SOLUTION INTRAMUSCULAR; INTRAVENOUS at 15:15

## 2018-12-09 RX ADMIN — Medication 3 MILLILITER(S): at 14:50

## 2018-12-09 RX ADMIN — Medication 50 MILLIGRAM(S): at 16:46

## 2018-12-09 RX ADMIN — Medication 3 MILLILITER(S): at 20:13

## 2018-12-09 RX ADMIN — Medication 5 MILLIGRAM(S): at 21:08

## 2018-12-09 RX ADMIN — Medication 325 MILLIGRAM(S): at 11:10

## 2018-12-09 RX ADMIN — ENOXAPARIN SODIUM 40 MILLIGRAM(S): 100 INJECTION SUBCUTANEOUS at 11:10

## 2018-12-09 RX ADMIN — Medication 3 MILLILITER(S): at 07:53

## 2018-12-09 NOTE — PROGRESS NOTE ADULT - SUBJECTIVE AND OBJECTIVE BOX
infectious diseases progress note:  LUZ HUGHES is a 58yFemale patient    PNEUMONIA;CHEST TIGHTNESS;SHORTNESS OF BREATH    Shortness of breath  Chest tightness  Pneumonia      ROS:  CONSTITUTIONAL:  Negative fever or chills, feels well, good appetite  EYES:  Negative  blurry vision or double vision  CARDIOVASCULAR:  Negative for chest pain or palpitations  RESPIRATORY:  Negative for cough, wheezing, or SOB   GASTROINTESTINAL:  Negative for nausea, vomiting, diarrhea, constipation, or abdominal pain  GENITOURINARY:  Negative frequency, urgency or dysuria  NEUROLOGIC:  No headache, confusion, dizziness, lightheadedness    Allergies    penicillin (Rash)    Intolerances        ANTIBIOTICS/RELEVANT:  antimicrobials  cefTRIAXone   IVPB 2 Gram(s) IV Intermittent every 24 hours  levoFLOXacin IVPB 500 milliGRAM(s) IV Intermittent every 24 hours    immunologic:    OTHER:  ALBUTerol/ipratropium for Nebulization 3 milliLiter(s) Nebulizer every 6 hours  aluminum hydroxide/magnesium hydroxide/simethicone Suspension 30 milliLiter(s) Oral every 6 hours PRN  aspirin 325 milliGRAM(s) Oral daily  chlorhexidine 4% Liquid 1 Application(s) Topical <User Schedule> PRN  enoxaparin Injectable 40 milliGRAM(s) SubCutaneous daily  guaiFENesin  milliGRAM(s) Oral every 12 hours      Objective:  T(F): 96 (12-09-18 @ 05:59), Max: 96.8 (12-08-18 @ 22:06)  HR: 61 (12-09-18 @ 05:59) (61 - 79)  BP: 122/72 (12-09-18 @ 05:59) (122/72 - 134/72)  RR: 16 (12-09-18 @ 05:59) (16 - 16)  SpO2: --    PHYSICAL EXAM  Constitutional:Well-developed, well nourished  Eyes:AMY, EOMI  Ear/Nose/Throat: no oral lesion, no sinus tenderness on percussion	  Neck:no JVD, no lymphadenopathy, supple  Respiratory: CTA manas  Cardiovascular: S1S2 RRR, no murmurs  Gastrointestinal:soft, (+) BS, no HSM  Extremities:no e/e/c    12-08    140  |  101  |  9<L>  ----------------------------<  129<H>  4.4   |  24  |  0.7        <--<9>>7.49                          12.4   9.24  )-----------( 317      ( 08 Dec 2018 07:59 )             38.0

## 2018-12-09 NOTE — PROGRESS NOTE ADULT - SUBJECTIVE AND OBJECTIVE BOX
LUZ HUGHES  58y  Female    Patient is a 58y old  Female who presents with a chief complaint of 58 YEARS OLD FEMALE C/O SOB . (09 Dec 2018 08:21)    ALLERGIES:  penicillin (Rash)      INTERVAL HPI/OVERNIGHT EVENTS:    VITALS:  T(F): 96 (12-09-18 @ 05:59), Max: 96.8 (12-08-18 @ 22:06)  HR: 61 (12-09-18 @ 05:59) (61 - 79)  BP: 122/72 (12-09-18 @ 05:59) (122/72 - 134/72)  RR: 16 (12-09-18 @ 05:59) (16 - 16)  SpO2: --    LABS:  12-08    140  |  101  |  9<L>  ----------------------------<  129<H>  4.4   |  24  |  0.7    Ca    9.3      08 Dec 2018 07:59      MICROBIOLOGY:    MEDICATION:  ALBUTerol/ipratropium for Nebulization 3 milliLiter(s) Nebulizer every 6 hours  aluminum hydroxide/magnesium hydroxide/simethicone Suspension 30 milliLiter(s) Oral every 6 hours PRN  cefTRIAXone   IVPB 2 Gram(s) IV Intermittent every 24 hours  predniSONE   Tablet 50 milliGRAM(s) Oral daily    RADIOLOGY & ADDITIONAL TESTS:

## 2018-12-09 NOTE — PROGRESS NOTE ADULT - ASSESSMENT
acute hypoxic respiratory failure  pneumonia  acute bronchitis    low flow oxygen, monitor SpO2  albuterol/ipratropium as ordered  ceftriaxone/levofloxacin, id following  could consider 5 day course of prednisone 40mg daily  consider repeat chest x-ray due to slow recovery  progressive ambulation

## 2018-12-09 NOTE — DIETITIAN INITIAL EVALUATION ADULT. - ENERGY NEEDS
Calories: 1956-2134kcals/day (MSJ A.F 1.1-1.2) Lower AF due to obesity   Protein: 68-82g/day (1-1.2g/kg)   Fluid: 1:1ml/kcal

## 2018-12-09 NOTE — PROGRESS NOTE ADULT - SUBJECTIVE AND OBJECTIVE BOX
LUZ HUGHES  58y, Female  Allergy: penicillin (Rash)      LAST 24-Hr EVENTS:  thinks she feels better than yesterday, less dyspnea on exertion  VITALS:  T(F): 96 (12-09-18 @ 05:59), Max: 96.8 (12-08-18 @ 22:06)  HR: 61 (12-09-18 @ 05:59)  BP: 122/72 (12-09-18 @ 05:59) (122/72 - 134/72)  RR: 16 (12-09-18 @ 05:59)  SpO2: --    PHYSICAL EXAM:    GENERAL: NAD, well-developed  HEAD:  Atraumatic, Normocephalic  NECK: Supple, No JVD  CHEST/LUNG: Clear to auscultation bilaterally; No wheeze  HEART: Regular rate and rhythm; No murmurs, rubs, or gallops  ABDOMEN: Soft, Nontender, Nondistended; Bowel sounds present  EXTREMITIES:  2+ Peripheral Pulses, No clubbing, cyanosis, or edema        TESTS & MEASUREMENTS:                          12.4   9.24  )-----------( 317      ( 08 Dec 2018 07:59 )             38.0       12-08    140  |  101  |  9<L>  ----------------------------<  129<H>  4.4   |  24  |  0.7    Ca    9.3      08 Dec 2018 07:59                  ABG & VENT SETTINGS: (when applicable)  ABG - ( 08 Dec 2018 09:50 )  pH, Arterial: 7.49  pH, Blood: x     /  pCO2: 34    /  pO2: 69    / HCO3: 26    / Base Excess: 3.2   /  SaO2: 95                    RADIOLOGY & ADDITIONAL TESTS:    MEDICATIONS:  MEDICATIONS  (STANDING):  ALBUTerol/ipratropium for Nebulization 3 milliLiter(s) Nebulizer every 6 hours  aspirin 325 milliGRAM(s) Oral daily  cefTRIAXone   IVPB 2 Gram(s) IV Intermittent every 24 hours  enoxaparin Injectable 40 milliGRAM(s) SubCutaneous daily  guaiFENesin  milliGRAM(s) Oral every 12 hours  levoFLOXacin IVPB 500 milliGRAM(s) IV Intermittent every 24 hours    MEDICATIONS  (PRN):  aluminum hydroxide/magnesium hydroxide/simethicone Suspension 30 milliLiter(s) Oral every 6 hours PRN Dyspepsia  chlorhexidine 4% Liquid 1 Application(s) Topical <User Schedule> PRN infection control

## 2018-12-09 NOTE — PROGRESS NOTE ADULT - ASSESSMENT
acute hypoxic respiratory failure  pneumonia  acute bronchitis  plan------------------------------------------------------  low flow oxygen, monitor SpO2  albuterol/ipratropium as ordered  ceftriaxone/levofloxacin, id following  could consider 5 day course of prednisone 40mg daily  consider repeat chest x-ray due to slow recovery  progressive ambulation

## 2018-12-09 NOTE — DIETITIAN INITIAL EVALUATION ADULT. - OTHER INFO
Reason for assessment: LOS. Pt has no PMH. Pt present to ED for SOB. Pt is admitted for acute resp failure, PNA, and acute bronchitis. Pt denies N/V/D. Last BM was 5 days ago. Pt reports that she took medication to help her go however it didn't work. Pt reports constipation which I reported to the RN. Abdomen noted as soft/nontender/nondistended. NKFA.

## 2018-12-09 NOTE — PROGRESS NOTE ADULT - ASSESSMENT
Less Dyspnea...was given Prednisone & was started on Rocephin 2gm (Pt weighs 243 lbs)    Tolerated Rocephin    PLAN  Continue Rocephin & Levaquin  Monitor oxygenation status

## 2018-12-10 LAB — S PNEUM AG UR QL: NEGATIVE — SIGNIFICANT CHANGE UP

## 2018-12-10 RX ADMIN — Medication 3 MILLILITER(S): at 08:00

## 2018-12-10 RX ADMIN — Medication 600 MILLIGRAM(S): at 08:21

## 2018-12-10 RX ADMIN — Medication 3 MILLILITER(S): at 20:40

## 2018-12-10 RX ADMIN — Medication 3 MILLILITER(S): at 13:15

## 2018-12-10 RX ADMIN — Medication 600 MILLIGRAM(S): at 20:59

## 2018-12-10 RX ADMIN — Medication 50 MILLIGRAM(S): at 14:10

## 2018-12-10 RX ADMIN — CEFTRIAXONE 100 GRAM(S): 500 INJECTION, POWDER, FOR SOLUTION INTRAMUSCULAR; INTRAVENOUS at 15:35

## 2018-12-10 RX ADMIN — ENOXAPARIN SODIUM 40 MILLIGRAM(S): 100 INJECTION SUBCUTANEOUS at 12:37

## 2018-12-10 RX ADMIN — Medication 325 MILLIGRAM(S): at 12:37

## 2018-12-10 NOTE — PROGRESS NOTE ADULT - ASSESSMENT
Pt with pneumonia (bibasilar opactities on Cxray)    FLU & RVP negative; R/O Legionella    On levoFLOXacin IVPB 500 milliGRAM(s) IV Intermittent every 24 hours  azithromycin  IVPB 500 milliGRAM(s) IV Intermittent every 24 hours    Patient has history of:  penicillin (Rash) nonspecific    Tmax 97.9 with wbc 9.24  Urinary legionella antigen negative    Pt with SIRS (fever, leukocytosis)    CT chest with 1.  No pulmonary embolus.   2.  Multifocal groundglass and consolidative opacities greatest in the   right lower lobe and left lower lobe consistent with an acute   infectious/inflammatory process.     SUGGESTIONs  Continue IV Levaquin  Rocephin 2gm IVPB q24h  D/C zithromax  Await urine pneumococcal antigen # Multifocal Pneumonia - FLU & RVP negative and Urinary legionella antigen negative  # Penicillin (Rash) nonspecific  # No pulmonary embolus.     would recommend:    1. Continue Ceftriaxone and Levaquin  2. Continue supplemental oxygenation and bronchodilator as needed  3. Taper off steroid as tolerated  4. Wean off oxygen as tolerated    - d/w patient

## 2018-12-10 NOTE — PROGRESS NOTE ADULT - SUBJECTIVE AND OBJECTIVE BOX
Patient is a 58y old  Female who presents with a chief complaint of 58 YEARS OLD FEMALE C/O SOB . (10 Dec 2018 10:00)      INTERVAL HPI/OVERNIGHT EVENTS:    MEDICATIONS  (STANDING):  ALBUTerol/ipratropium for Nebulization 3 milliLiter(s) Nebulizer every 6 hours  aspirin 325 milliGRAM(s) Oral daily  cefTRIAXone   IVPB 2 Gram(s) IV Intermittent every 24 hours  enoxaparin Injectable 40 milliGRAM(s) SubCutaneous daily  guaiFENesin  milliGRAM(s) Oral every 12 hours  levoFLOXacin IVPB 500 milliGRAM(s) IV Intermittent every 24 hours  predniSONE   Tablet 50 milliGRAM(s) Oral daily    MEDICATIONS  (PRN):  aluminum hydroxide/magnesium hydroxide/simethicone Suspension 30 milliLiter(s) Oral every 6 hours PRN Dyspepsia  chlorhexidine 4% Liquid 1 Application(s) Topical <User Schedule> PRN infection control      Allergies    penicillin (Rash)    Intolerances        REVIEW OF SYSTEMS:  CONSTITUTIONAL: No fever, weight loss, or fatigue  EYES: No eye pain, visual disturbances, or discharge  ENMT:  No difficulty hearing, tinnitus, vertigo; No sinus or throat pain  NECK: No pain or stiffness  BREASTS: No pain, masses, or nipple discharge  RESPIRATORY: No cough, wheezing, chills or hemoptysis; No shortness of breath  CARDIOVASCULAR: No chest pain, palpitations, dizziness, or leg swelling  GASTROINTESTINAL: No abdominal or epigastric pain. No nausea, vomiting, or hematemesis; No diarrhea or constipation. No melena or hematochezia.  GENITOURINARY: No dysuria, frequency, hematuria, or incontinence  NEUROLOGICAL: No headaches, memory loss, loss of strength, numbness, or tremors  SKIN: No itching, burning, rashes, or lesions   LYMPH NODES: No enlarged glands  ENDOCRINE: No heat or cold intolerance; No hair loss  MUSCULOSKELETAL: No joint pain or swelling; No muscle, back, or extremity pain  PSYCHIATRIC: No depression, anxiety, mood swings, or difficulty sleeping  HEME/LYMPH: No easy bruising, or bleeding gums  ALLERGY AND IMMUNOLOGIC: No hives or eczema    Vital Signs Last 24 Hrs  T(C): 35.6 (10 Dec 2018 14:57), Max: 35.8 (09 Dec 2018 22:08)  T(F): 96 (10 Dec 2018 14:57), Max: 96.4 (09 Dec 2018 22:08)  HR: 86 (10 Dec 2018 14:57) (50 - 87)  BP: 125/63 (10 Dec 2018 14:57) (125/63 - 137/61)  BP(mean): --  RR: 16 (10 Dec 2018 14:57) (16 - 16)  SpO2: --    PHYSICAL EXAM:  GENERAL: NAD, well-groomed, well-developed  HEAD:  Atraumatic, Normocephalic  EYES: EOMI, PERRLA, conjunctiva and sclera clear  ENMT: No tonsillar erythema, exudates, or enlargement; Moist mucous membranes, Good dentition, No lesions  NECK: Supple, No JVD, Normal thyroid  NERVOUS SYSTEM:  Alert & Oriented X3, Good concentration; Motor Strength 5/5 B/L upper and lower extremities; DTRs 2+ intact and symmetric  CHEST/LUNG: Clear to percussion bilaterally; No rales, rhonchi, wheezing, or rubs  HEART: Regular rate and rhythm; No murmurs, rubs, or gallops  ABDOMEN: Soft, Nontender, Nondistended; Bowel sounds present  EXTREMITIES:  2+ Peripheral Pulses, No clubbing, cyanosis, or edema  LYMPH: No lymphadenopathy noted  SKIN: No rashes or lesions    LABS:              CAPILLARY BLOOD GLUCOSE          RADIOLOGY & ADDITIONAL TESTS:    Imaging Personally Reviewed:  [ ] YES  [ ] NO    Consultant(s) Notes Reviewed:  [ ] YES  [ ] NO    Care Discussed with Consultants/Other Providers [ ] YES  [ ] NO Patient is seen and examined at the bed side, is afebrile. She is still desaturating at room air to 80 to 85 %.        REVIEW OF SYSTEMS: All other review systems are negative        Vital Signs Last 24 Hrs  T(C): 35.6 (10 Dec 2018 14:57), Max: 35.8 (09 Dec 2018 22:08)  T(F): 96 (10 Dec 2018 14:57), Max: 96.4 (09 Dec 2018 22:08)  HR: 86 (10 Dec 2018 14:57) (50 - 87)  BP: 125/63 (10 Dec 2018 14:57) (125/63 - 137/61)  BP(mean): --  RR: 16 (10 Dec 2018 14:57) (16 - 16)  SpO2: --        PHYSICAL EXAM:  GENERAL: Not in distress, on oxygen via NC  CHEST/LUNG: Air entry bilaterally  HEART: s1 and s2 present  ABDOMEN: Nontender and  Nondistended  EXTREMITIES: No pedal  edema  CNS: Awake and alert        MEDICATIONS  (STANDING):  ALBUTerol/ipratropium for Nebulization 3 milliLiter(s) Nebulizer every 6 hours  aspirin 325 milliGRAM(s) Oral daily  cefTRIAXone   IVPB 2 Gram(s) IV Intermittent every 24 hours  enoxaparin Injectable 40 milliGRAM(s) SubCutaneous daily  guaiFENesin  milliGRAM(s) Oral every 12 hours  levoFLOXacin IVPB 500 milliGRAM(s) IV Intermittent every 24 hours  predniSONE   Tablet 50 milliGRAM(s) Oral daily    MEDICATIONS  (PRN):  aluminum hydroxide/magnesium hydroxide/simethicone Suspension 30 milliLiter(s) Oral every 6 hours PRN Dyspepsia  chlorhexidine 4% Liquid 1 Application(s) Topical <User Schedule> PRN infection control        Allergies    penicillin (Rash)        LABS:    No new LAbs        RADIOLOGY & ADDITIONAL TESTS:    12/7/18 : CT Chest w/ IV Cont (12.07.18 @ 13:04) 1.  No pulmonary embolus.   2.  Multifocal ground glass and consolidative opacities greatest in the right lower lobe and left lower lobe consistent with an acute infectious/inflammatory process.       MICROBIOLOGY DATA:    Culture - Sputum . (12.09.18 @ 14:27)    Gram Stain:   Rare polymorphonuclear leukocytes per low power field  Rare Squamous epithelial cells per low power field  Numerous Gram Positive Cocci in Pairs and Chains per oil power field  Numerous Gram Negative Diplococci per oil power field  Moderate Gram Negative Rods per oil power field  Few Gram Positive Cocci in Clusters per oil power field    Specimen Source: .Sputum Sputum    Culture Results:   Normal Respiratory Kaylynn present        Culture - Blood in AM (12.08.18 @ 07:59)    Specimen Source: .Blood Blood-Venous    Culture Results:   No growth to date.      Legionella pneumophila Antigen, Urine (12.08.18 @ 00:00)    Legionella Antigen, Urine: Negative      Streptococcus Pneumoniae Ag Urine (12.08.18 @ 00:00)    Streptococcus Pneumoniae Ag Urine: Negative: Presumptive negative for pneumococcal pneumonia, suggesting  no current or recent infection.  Infection due to S.  pneumoniae cannot be ruled out since the antigen present in  the sample may be below detection limit of the test.  -------------------ADDITIONAL INFORMATION-------------------  This assay was performed using the FDA-cleared BinaxNOW  Streptococcus pneumoniae Antigen test, a rapid  immunochromatographic assay.  Test Performed by:  [facility]  [address] [place] [id number]

## 2018-12-10 NOTE — PROGRESS NOTE ADULT - ASSESSMENT
IMPRESSION:  59 yo female non smoker with no significant PMH presenting with a 3 week history of exertional dyspnea, recent onset of cough and fever to 101 with elevated WBC and bibasilar opacities on CXR   Findings consistent with community acquired pneumonia  No PE on CT    PLAN:----------------------------------------------------------------------  Monitor O2 sats, continue O2 as required to maintain saturation > 90%  Continue Antibiotics and empiric steroids  DVT prophylaxis  OOB  Monitor labs  pulmonary notes appreciated

## 2018-12-10 NOTE — PROGRESS NOTE ADULT - SUBJECTIVE AND OBJECTIVE BOX
LUZ HUGHES  58y  Female    Patient is a 58y old  Female who presents with a chief complaint of 58 YEARS OLD FEMALE C/O SOB . (10 Dec 2018 06:33)    ALLERGIES:  penicillin (Rash)      INTERVAL HPI/OVERNIGHT EVENTS:    VITALS:  T(F): 96 (12-10-18 @ 05:30), Max: 96.6 (12-09-18 @ 13:54)  HR: 50 (12-10-18 @ 05:30) (50 - 87)  BP: 135/74 (12-10-18 @ 05:30) (135/74 - 147/71)  RR: 16 (12-10-18 @ 05:30) (16 - 16)  SpO2: --    LABS:        MICROBIOLOGY:    Culture - Blood (collected 12-08-18 @ 07:59)  Source: .Blood Blood-Venous  Preliminary Report (12-09-18 @ 18:00):    No growth to date.      MEDICATION:  ALBUTerol/ipratropium for Nebulization 3 milliLiter(s) Nebulizer every 6 hours  cefTRIAXone   IVPB 2 Gram(s) IV Intermittent every 24 hours  predniSONE   Tablet 50 milliGRAM(s) Oral daily    RADIOLOGY & ADDITIONAL TESTS:

## 2018-12-10 NOTE — PROGRESS NOTE ADULT - ASSESSMENT
IMPRESSION:  59 yo female non smoker with no significant PMH presenting with a 3 week history of exertional dyspnea, recent onset of cough and fever to 101 with elevated WBC and bibasilar opacities on CXR   Findings consistent with community acquired pneumonia  No PE on CT    PLAN:  Monitor O2 sats, continue O2 as required to maintain saturation > 90%  Continue Antibiotics and empiric steroids  DVT prophylaxis  OOB  Monitor labs

## 2018-12-10 NOTE — PROGRESS NOTE ADULT - SUBJECTIVE AND OBJECTIVE BOX
Interval history and ROS:    Feeling marginally better  Still has cough and DIALLO    MEDICATIONS:  ALBUTerol/ipratropium for Nebulization 3 milliLiter(s) Nebulizer every 6 hours  aluminum hydroxide/magnesium hydroxide/simethicone Suspension 30 milliLiter(s) Oral every 6 hours PRN  aspirin 325 milliGRAM(s) Oral daily  cefTRIAXone   IVPB 2 Gram(s) IV Intermittent every 24 hours  chlorhexidine 4% Liquid 1 Application(s) Topical <User Schedule> PRN  enoxaparin Injectable 40 milliGRAM(s) SubCutaneous daily  guaiFENesin  milliGRAM(s) Oral every 12 hours  levoFLOXacin IVPB 500 milliGRAM(s) IV Intermittent every 24 hours  predniSONE   Tablet 50 milliGRAM(s) Oral daily      VITAL SIGNS:  Vital Signs Last 24 Hrs  T(C): 35.6 (10 Dec 2018 05:30), Max: 35.9 (09 Dec 2018 13:54)  T(F): 96 (10 Dec 2018 05:30), Max: 96.6 (09 Dec 2018 13:54)  HR: 50 (10 Dec 2018 05:30) (50 - 87)  BP: 135/74 (10 Dec 2018 05:30) (135/74 - 147/71)  BP(mean): --  RR: 16 (10 Dec 2018 05:30) (16 - 16)  SpO2: -- on NC        PHYSICAL EXAM:  Awake and alert NAD  Neck supple, no LN  S1 and S2 no murmur  Lungs are clear without wheeze, rhonchi or rales  Abdomen is soft and non tender  There is no edema  Neurologically non focal      LABS:                        12.4   9.24  )-----------( 317      ( 08 Dec 2018 07:59 )             38.0     12-08    140  |  101  |  9<L>  ----------------------------<  129<H>  4.4   |  24  |  0.7    Ca    9.3      08 Dec 2018 07:59          RADIOLOGY & ADDITIONAL TESTS:   < from: CT Chest w/ IV Cont (12.07.18 @ 13:04) >  1.  No pulmonary embolus.   2.  Multifocal groundglass and consolidative opacities greatest in the   right lower lobe and left lower lobe consistent with an acute   infectious/inflammatory process.

## 2018-12-11 DIAGNOSIS — J13 PNEUMONIA DUE TO STREPTOCOCCUS PNEUMONIAE: ICD-10-CM

## 2018-12-11 LAB
CULTURE RESULTS: SIGNIFICANT CHANGE UP
SPECIMEN SOURCE: SIGNIFICANT CHANGE UP

## 2018-12-11 RX ORDER — CEFPODOXIME PROXETIL 100 MG
200 TABLET ORAL EVERY 12 HOURS
Qty: 0 | Refills: 0 | Status: DISCONTINUED | OUTPATIENT
Start: 2018-12-11 | End: 2018-12-13

## 2018-12-11 RX ORDER — DOCUSATE SODIUM 100 MG
100 CAPSULE ORAL DAILY
Qty: 0 | Refills: 0 | Status: COMPLETED | OUTPATIENT
Start: 2018-12-11 | End: 2018-12-12

## 2018-12-11 RX ADMIN — Medication 600 MILLIGRAM(S): at 10:20

## 2018-12-11 RX ADMIN — Medication 200 MILLIGRAM(S): at 20:47

## 2018-12-11 RX ADMIN — Medication 600 MILLIGRAM(S): at 20:47

## 2018-12-11 RX ADMIN — Medication 50 MILLIGRAM(S): at 10:20

## 2018-12-11 RX ADMIN — Medication 325 MILLIGRAM(S): at 15:01

## 2018-12-11 RX ADMIN — Medication 200 MILLIGRAM(S): at 10:20

## 2018-12-11 RX ADMIN — Medication 3 MILLILITER(S): at 13:37

## 2018-12-11 RX ADMIN — Medication 100 MILLIGRAM(S): at 20:53

## 2018-12-11 RX ADMIN — Medication 3 MILLILITER(S): at 08:26

## 2018-12-11 RX ADMIN — Medication 3 MILLILITER(S): at 19:35

## 2018-12-11 RX ADMIN — ENOXAPARIN SODIUM 40 MILLIGRAM(S): 100 INJECTION SUBCUTANEOUS at 15:01

## 2018-12-11 NOTE — PROGRESS NOTE ADULT - SUBJECTIVE AND OBJECTIVE BOX
Interval history and ROS:    Modest improvement reported    MEDICATIONS:  ALBUTerol/ipratropium for Nebulization 3 milliLiter(s) Nebulizer every 6 hours  aluminum hydroxide/magnesium hydroxide/simethicone Suspension 30 milliLiter(s) Oral every 6 hours PRN  aspirin 325 milliGRAM(s) Oral daily  cefpodoxime 200 milliGRAM(s) Oral every 12 hours  chlorhexidine 4% Liquid 1 Application(s) Topical <User Schedule> PRN  enoxaparin Injectable 40 milliGRAM(s) SubCutaneous daily  guaiFENesin  milliGRAM(s) Oral every 12 hours  predniSONE   Tablet 50 milliGRAM(s) Oral daily      VITAL SIGNS:  Vital Signs Last 24 Hrs  T(C): 35.9 (11 Dec 2018 05:36), Max: 36.1 (10 Dec 2018 21:02)  T(F): 96.6 (11 Dec 2018 05:36), Max: 96.9 (10 Dec 2018 21:02)  HR: 53 (11 Dec 2018 05:36) (53 - 86)  BP: 130/71 (11 Dec 2018 05:36) (123/59 - 130/71)  BP(mean): --  RR: 16 (11 Dec 2018 05:36) (16 - 16)  SpO2: -- NC O2        PHYSICAL EXAM:  Awake and alert NAD  Neck supple, no LN  S1 and S2 no murmur  Lungs decreased BS  Abdomen is soft and non tender  There is no edema  Neurologically non focal

## 2018-12-11 NOTE — PROGRESS NOTE ADULT - ASSESSMENT
IMPRESSION:  57 yo female non smoker with no significant PMH presenting with a 3 week history of exertional dyspnea, recent onset of cough and fever to 101 with elevated WBC and bibasilar opacities on CXR   Findings consistent with community acquired pneumonia  No PE on CT  Gradual improvement    PLAN:  Monitor O2 sats, continue O2 as required to maintain saturation > 90%  Continue Antibiotics and empiric steroids  DVT prophylaxis  OOB, ambulate as tolerated, monitor sats  Monitor labs

## 2018-12-11 NOTE — PROGRESS NOTE ADULT - RESPIRATORY
Breath Sounds equal & clear to percussion & auscultation, no accessory muscle use
detailed exam

## 2018-12-11 NOTE — PROGRESS NOTE ADULT - SUBJECTIVE AND OBJECTIVE BOX
LUZ HUGHES  58y  Female    Patient is a 58y old  Female who presents with a chief complaint of 58 YEARS OLD FEMALE C/O SOB . (11 Dec 2018 07:07)    ALLERGIES:  penicillin (Rash)      INTERVAL HPI/OVERNIGHT EVENTS:    VITALS:  T(F): 96.3 (12-11-18 @ 14:38), Max: 96.9 (12-10-18 @ 21:02)  HR: 86 (12-11-18 @ 14:38) (53 - 86)  BP: 139/88 (12-11-18 @ 14:38) (123/59 - 139/88)  RR: 16 (12-11-18 @ 14:38) (16 - 16)  SpO2: 96% (12-11-18 @ 10:32) (96% - 96%)    LABS:        MICROBIOLOGY:    MEDICATION:  ALBUTerol/ipratropium for Nebulization 3 milliLiter(s) Nebulizer every 6 hours  cefpodoxime 200 milliGRAM(s) Oral every 12 hours  predniSONE   Tablet 50 milliGRAM(s) Oral daily    RADIOLOGY & ADDITIONAL TESTS:

## 2018-12-12 ENCOUNTER — TRANSCRIPTION ENCOUNTER (OUTPATIENT)
Age: 58
End: 2018-12-12

## 2018-12-12 LAB
ESTIMATED AVERAGE GLUCOSE: 154 MG/DL — HIGH (ref 68–114)
HBA1C BLD-MCNC: 7 % — HIGH (ref 4–5.6)

## 2018-12-12 RX ORDER — IPRATROPIUM/ALBUTEROL SULFATE 18-103MCG
3 AEROSOL WITH ADAPTER (GRAM) INHALATION
Qty: 150 | Refills: 0 | OUTPATIENT
Start: 2018-12-12

## 2018-12-12 RX ORDER — CEFPODOXIME PROXETIL 100 MG
1 TABLET ORAL
Qty: 18 | Refills: 0 | OUTPATIENT
Start: 2018-12-12

## 2018-12-12 RX ADMIN — Medication 200 MILLIGRAM(S): at 21:57

## 2018-12-12 RX ADMIN — Medication 600 MILLIGRAM(S): at 21:57

## 2018-12-12 RX ADMIN — Medication 30 MILLILITER(S): at 00:04

## 2018-12-12 RX ADMIN — Medication 3 MILLILITER(S): at 07:48

## 2018-12-12 RX ADMIN — Medication 50 MILLIGRAM(S): at 10:09

## 2018-12-12 RX ADMIN — Medication 100 MILLIGRAM(S): at 16:02

## 2018-12-12 RX ADMIN — Medication 325 MILLIGRAM(S): at 16:01

## 2018-12-12 RX ADMIN — Medication 200 MILLIGRAM(S): at 10:09

## 2018-12-12 RX ADMIN — Medication 10 MILLIGRAM(S): at 22:52

## 2018-12-12 RX ADMIN — Medication 3 MILLILITER(S): at 20:14

## 2018-12-12 RX ADMIN — Medication 600 MILLIGRAM(S): at 10:10

## 2018-12-12 RX ADMIN — Medication 3 MILLILITER(S): at 14:37

## 2018-12-12 NOTE — PROGRESS NOTE ADULT - ASSESSMENT
IMPRESSION:  59 yo female non smoker with no significant PMH presenting with a 3 week history of exertional dyspnea, recent onset of cough and fever to 101 with elevated WBC and bibasilar opacities on CXR   Community acquired pneumonia due to s. pneumoniae  No PE on CT  Gradual improvement    PLAN:  Monitor O2 sats, continue O2 as required to maintain saturation > 90%  May require short term home O2 if sats with ambulation continue below 89%  Continue antibiotics and empiric steroids  DVT prophylaxis  OOB, ambulate as tolerated, monitor sats  Monitor labs

## 2018-12-12 NOTE — PROGRESS NOTE ADULT - SUBJECTIVE AND OBJECTIVE BOX
LUZ HUGHES  58y  Female      Patient is a 58y old  Female who presents with a chief complaint of 58 YEARS OLD FEMALE C/O SOB . (12 Dec 2018 06:47)        IPNEUMONIA;CHEST TIGHTNESS;SHORTNESS OF BREATH  ^SOB  Handoff  MEWS Score  No pertinent past medical history  Pneumonia  Pneumonia of both lungs due to Streptococcus pneumoniae, unspecified part of lung  Shortness of breath  Chest tightness  Pneumonia  History of dilatation and curettage  SOB  Shortness of breath  Chest tightness  NTERVAL HPI/OVERNIGHT EVENTS:        REVIEW OF SYSTEMS:  CONSTITUTIONAL: No fever, weight loss, or fatigue  EYES: No eye pain, visual disturbances, or discharge  ENMT:  No difficulty hearing, tinnitus, vertigo; No sinus or throat pain  NECK: No pain or stiffness  BREASTS: No pain, masses, or nipple discharge  RESPIRATORY: No cough, wheezing, chills or hemoptysis; No shortness of breath  CARDIOVASCULAR: No chest pain, palpitations, dizziness, or leg swelling  GASTROINTESTINAL: No abdominal or epigastric pain. No nausea, vomiting, or hematemesis; No diarrhea or constipation. No melena or hematochezia.  GENITOURINARY: No dysuria, frequency, hematuria, or incontinence  NEUROLOGICAL: No headaches, memory loss, loss of strength, numbness, or tremors  SKIN: No itching, burning, rashes, or lesions   LYMPH NODES: No enlarged glands  ENDOCRINE: No heat or cold intolerance; No hair loss  MUSCULOSKELETAL: No joint pain or swelling; No muscle, back, or extremity pain  PSYCHIATRIC: No depression, anxiety, mood swings, or difficulty sleeping  HEME/LYMPH: No easy bruising, or bleeding gums  ALLERY AND IMMUNOLOGIC: No hives or eczema  FAMILY HISTORY:    T(C): 35.6 (12-12-18 @ 06:00), Max: 35.8 (12-11-18 @ 21:49)  HR: 48 (12-12-18 @ 06:00) (48 - 86)  BP: 117/58 (12-12-18 @ 06:00) (117/58 - 139/88)  RR: 16 (12-12-18 @ 06:00) (16 - 16)  SpO2: 96% (12-11-18 @ 10:32) (96% - 96%)  Wt(kg): --Vital Signs Last 24 Hrs  T(C): 35.6 (12 Dec 2018 06:00), Max: 35.8 (11 Dec 2018 21:49)  T(F): 96 (12 Dec 2018 06:00), Max: 96.4 (11 Dec 2018 21:49)  HR: 48 (12 Dec 2018 06:00) (48 - 86)  BP: 117/58 (12 Dec 2018 06:00) (117/58 - 139/88)  BP(mean): --  RR: 16 (12 Dec 2018 06:00) (16 - 16)  SpO2: 96% (11 Dec 2018 10:32) (96% - 96%)    PHYSICAL EXAM:  GENERAL: NAD, well-groomed, well-developed  HEAD:  Atraumatic, Normocephalic  EYES: EOMI, PERRLA, conjunctiva and sclera clear  ENMT: No tonsillar erythema, exudates, or enlargement; Moist mucous membranes, Good dentition, No lesions  NECK: Supple, No JVD, Normal thyroid  NERVOUS SYSTEM:  Alert & Oriented X3, Good concentration; Motor Strength 5/5 B/L upper and lower extremities; DTRs 2+ intact and symmetric  CHEST/LUNG: Clear to percussion bilaterally; No rales, rhonchi, wheezing, or rubs  HEART: Regular rate and rhythm; No murmurs, rubs, or gallops  ABDOMEN: Soft, Nontender, Nondistended; Bowel sounds present  EXTREMITIES:  2+ Peripheral Pulses, No clubbing, cyanosis, or edema  LYMPH: No lymphadenopathy noted  SKIN: No rashes or lesions    Consultant(s) Notes Reviewed:  [x ] YES  [ ] NO  Care Discussed with Consultants/Other Providers [ x] YES  [ ] NO          Imaging Personally Reviewed:  [ ] YES  [ ] NO    HEALTH ISSUES - PROBLEM Dx:  Pneumonia of both lungs due to Streptococcus pneumoniae, unspecified part of lung: Pneumonia of both lungs due to Streptococcus pneumoniae, unspecified part of lung  Shortness of breath: Shortness of breath  Chest tightness: Chest tightness  Pneumonia: Pneumonia

## 2018-12-12 NOTE — PROGRESS NOTE ADULT - SUBJECTIVE AND OBJECTIVE BOX
Interval history and ROS:    Cough and DIALLO, gradually improving  Still requires O2 with ambulation    MEDICATIONS:  ALBUTerol/ipratropium for Nebulization 3 milliLiter(s) Nebulizer every 6 hours  aluminum hydroxide/magnesium hydroxide/simethicone Suspension 30 milliLiter(s) Oral every 6 hours PRN  aspirin 325 milliGRAM(s) Oral daily  cefpodoxime 200 milliGRAM(s) Oral every 12 hours  chlorhexidine 4% Liquid 1 Application(s) Topical <User Schedule> PRN  docusate sodium 100 milliGRAM(s) Oral daily PRN  enoxaparin Injectable 40 milliGRAM(s) SubCutaneous daily  guaiFENesin  milliGRAM(s) Oral every 12 hours  predniSONE   Tablet 50 milliGRAM(s) Oral daily      VITAL SIGNS:  Vital Signs Last 24 Hrs  T(C): 35.8 (11 Dec 2018 21:49), Max: 35.8 (11 Dec 2018 21:49)  T(F): 96.4 (11 Dec 2018 21:49), Max: 96.4 (11 Dec 2018 21:49)  HR: 80 (11 Dec 2018 21:49) (80 - 86)  BP: 123/70 (11 Dec 2018 21:49) (123/70 - 139/88)  BP(mean): --  RR: 16 (11 Dec 2018 14:38) (16 - 16)  SpO2: 96% (11 Dec 2018 10:32) (96% - 96%)        PHYSICAL EXAM:  Awake and alert NAD  Neck supple, no LN  S1 and S2 no murmur  Lungs decreased basilar BS  Abdomen is soft and non tender  There is no edema  Neurologically non focal

## 2018-12-12 NOTE — DISCHARGE NOTE ADULT - MEDICATION SUMMARY - MEDICATIONS TO TAKE
I will START or STAY ON the medications listed below when I get home from the hospital:    ipratropium-albuterol 0.5 mg-2.5 mg/3 mLinhalation solution  -- 3 milliliter(s) inhaled every 6 hours  -- Indication: For PNEUMONIA;CHEST TIGHTNESS;SHORTNESS OF BREATH    cefpodoxime 200 mg oral tablet  -- 1 tab(s) by mouth every 12 hours  -- Indication: For PNEUMONIA;CHEST TIGHTNESS;SHORTNESS OF BREATH    guaiFENesin 600 mg oral tablet, extended release  -- 1 tab(s) by mouth every 12 hours  -- Indication: For Pneumonia

## 2018-12-12 NOTE — DISCHARGE NOTE ADULT - HOSPITAL COURSE
to be completed by attending · Chief Complaint: The patient is a 58y Female complaining of shortness of breath.	  · HPI Objective Statement: The patient is a 58y Female with no significant PMH presenting to ED with worsening shortness of breath x 3 days. Patient states she's noticed some shortness of breath with exertion over the past 3wks but within the last 3 days it has worsened where she cannot walk up one flight of stairs without becoming short of breath. She also notes non-radiating chest tightness over the past 3 days with exertion. She endorses a semi-productive cough and a fever yesterday of 101F. She has not taken any medications today. She denies abdominal pain, n/v/d, urinary changes, recent travel, leg swelling, calf pain and hormone use. Patient states she has never had a stress test and/or ECHO. FHx (+): Mom had MI with CABG at age 70.	    HIV:   HIV Status:  · Offered: Declined	    PAST MEDICAL/SURGICAL/FAMILY/SOCIAL HISTORY:   Past Medical History:  No pertinent past medical history.    Past Surgical History:  History of dilatation and curettage.    Tobacco Usage:  · Tobacco Usage	Never smoker	    ALLERGIES AND HOME MEDICATIONS:   Allergies:        Allergies:  	penicillin: Drug, Rash  hospital course ------------was admitted w pneumonia given iv antibiotic and improved

## 2018-12-12 NOTE — DISCHARGE NOTE ADULT - CARE PROVIDER_API CALL
Janak Siegel), Internal Medicine  [address] [place] [address]  Phone: [phone]  Fax: [phone]    Santos Soni (), Critical Care Medicine; Internal Medicine; Pulmonary Disease  [address] [place] [address]  Phone: [phone]  Fax: [phone]

## 2018-12-12 NOTE — PROGRESS NOTE ADULT - ASSESSMENT
IMPRESSION:  59 yo female non smoker with no significant PMH presenting with a 3 week history of exertional dyspnea, recent onset of cough and fever to 101 with elevated WBC and bibasilar opacities on CXR   Community acquired pneumonia due to s. pneumoniae  No PE on CT  Gradual improvement    PLAN:  Monitor O2 sats, continue O2 as required to maintain saturation > 90%  May require short term home O2 if sats with ambulation continue below 89%  Continue antibiotics and empiric steroids  DVT prophylaxis  OOB, ambulate as tolerated, monitor sats    discharge planning  may need short term home oxygen  will arrange than discharge

## 2018-12-12 NOTE — DISCHARGE NOTE ADULT - CARE PLAN
Principal Discharge DX:	Pneumonia of both lungs due to Streptococcus pneumoniae, unspecified part of lung  Goal:	improve symptoms  Assessment and plan of treatment:	follow up with pulmonary and primary care doctor in 1 week  call doctor if increasing shortness of breath, fever, chest pain Principal Discharge DX:	Pneumonia of both lungs due to Streptococcus pneumoniae, unspecified part of lung  Goal:	improve symptoms  Assessment and plan of treatment:	follow up with pulmonary and primary care doctor in 1 week  call doctor if increasing shortness of breath, fever, chest pain  home oxygen for exertion 2lnc

## 2018-12-12 NOTE — CHART NOTE - NSCHARTNOTEFT_GEN_A_CORE
Despite IV steroids and bronchodilators patient desaturates to due to hypoxemia    - Room air pulse ox. at rest:  ** 95%    - Room air pulse ox while ambulating: ** 86%    - Pulse ox while ambulating on 3liters O2 ** 95%    Patient will require home O2 for discharge.  Patient is aware and agreeable to home O2.      Patient treated for pneumonia: Yes

## 2018-12-12 NOTE — DISCHARGE NOTE ADULT - PATIENT PORTAL LINK FT
You can access the [facility] Patient Portal, offered by [facility], by registering with the following website: [email or web address]

## 2018-12-13 VITALS
DIASTOLIC BLOOD PRESSURE: 67 MMHG | HEART RATE: 74 BPM | RESPIRATION RATE: 16 BRPM | TEMPERATURE: 97 F | SYSTOLIC BLOOD PRESSURE: 138 MMHG

## 2018-12-13 LAB
CULTURE RESULTS: SIGNIFICANT CHANGE UP
SPECIMEN SOURCE: SIGNIFICANT CHANGE UP

## 2018-12-13 RX ADMIN — Medication 600 MILLIGRAM(S): at 09:38

## 2018-12-13 RX ADMIN — Medication 3 MILLILITER(S): at 14:34

## 2018-12-13 RX ADMIN — Medication 3 MILLILITER(S): at 07:26

## 2018-12-13 RX ADMIN — Medication 200 MILLIGRAM(S): at 09:37

## 2018-12-13 RX ADMIN — Medication 50 MILLIGRAM(S): at 09:37

## 2018-12-13 NOTE — PROGRESS NOTE ADULT - SUBJECTIVE AND OBJECTIVE BOX
LUZ HUGHES  58y  Female      Patient is a 58y old  Female who presents with a chief complaint of 58 YEARS OLD FEMALE C/O SOB . (13 Dec 2018 06:53)        IPNEUMONIA;CHEST TIGHTNESS;SHORTNESS OF BREATH  ^SOB  Handoff  MEWS Score  No pertinent past medical history  Pneumonia of both lungs due to Streptococcus pneumoniae, unspecified part of lung  Pneumonia  Pneumonia of both lungs due to Streptococcus pneumoniae, unspecified part of lung  Shortness of breath  Chest tightness  Pneumonia  History of dilatation and curettage  SOB  Shortness of breath  Chest tightness  NTERVAL HPI/OVERNIGHT EVENTS:        REVIEW OF SYSTEMS:  CONSTITUTIONAL: No fever, weight loss, or fatigue  EYES: No eye pain, visual disturbances, or discharge  ENMT:  No difficulty hearing, tinnitus, vertigo; No sinus or throat pain  NECK: No pain or stiffness  BREASTS: No pain, masses, or nipple discharge  RESPIRATORY: No cough, wheezing, chills or hemoptysis; No shortness of breath  CARDIOVASCULAR: No chest pain, palpitations, dizziness, or leg swelling  GASTROINTESTINAL: No abdominal or epigastric pain. No nausea, vomiting, or hematemesis; No diarrhea or constipation. No melena or hematochezia.  GENITOURINARY: No dysuria, frequency, hematuria, or incontinence  NEUROLOGICAL: No headaches, memory loss, loss of strength, numbness, or tremors  SKIN: No itching, burning, rashes, or lesions   LYMPH NODES: No enlarged glands  ENDOCRINE: No heat or cold intolerance; No hair loss  MUSCULOSKELETAL: No joint pain or swelling; No muscle, back, or extremity pain  PSYCHIATRIC: No depression, anxiety, mood swings, or difficulty sleeping  HEME/LYMPH: No easy bruising, or bleeding gums  ALLERY AND IMMUNOLOGIC: No hives or eczema  FAMILY HISTORY:    T(C): 35.7 (12-13-18 @ 05:44), Max: 36.2 (12-12-18 @ 22:07)  HR: 42 (12-13-18 @ 05:44) (42 - 60)  BP: 122/58 (12-13-18 @ 05:44) (122/58 - 136/65)  RR: 16 (12-13-18 @ 05:44) (16 - 16)  SpO2: 95% (12-12-18 @ 12:03) (86% - 95%)  Wt(kg): --Vital Signs Last 24 Hrs  T(C): 35.7 (13 Dec 2018 05:44), Max: 36.2 (12 Dec 2018 22:07)  T(F): 96.2 (13 Dec 2018 05:44), Max: 97.1 (12 Dec 2018 22:07)  HR: 42 (13 Dec 2018 05:44) (42 - 60)  BP: 122/58 (13 Dec 2018 05:44) (122/58 - 136/65)  BP(mean): --  RR: 16 (13 Dec 2018 05:44) (16 - 16)  SpO2: 95% (12 Dec 2018 12:03) (86% - 95%)    PHYSICAL EXAM:  GENERAL: NAD, well-groomed, well-developed  HEAD:  Atraumatic, Normocephalic  EYES: EOMI, PERRLA, conjunctiva and sclera clear  ENMT: No tonsillar erythema, exudates, or enlargement; Moist mucous membranes, Good dentition, No lesions  NECK: Supple, No JVD, Normal thyroid  NERVOUS SYSTEM:  Alert & Oriented X3, Good concentration; Motor Strength 5/5 B/L upper and lower extremities; DTRs 2+ intact and symmetric  CHEST/LUNG: Clear to percussion bilaterally; No rales, rhonchi, wheezing, or rubs  HEART: Regular rate and rhythm; No murmurs, rubs, or gallops  ABDOMEN: Soft, Nontender, Nondistended; Bowel sounds present  EXTREMITIES:  2+ Peripheral Pulses, No clubbing, cyanosis, or edema  LYMPH: No lymphadenopathy noted  SKIN: No rashes or lesions    Consultant(s) Notes Reviewed:  [x ] YES  [ ] NO  Care Discussed with Consultants/Other Providers [ x] YES  [ ] NO          Imaging Personally Reviewed:  [ ] YES  [ ] NO    HEALTH ISSUES - PROBLEM Dx:  Pneumonia of both lungs due to Streptococcus pneumoniae, unspecified part of lung: Pneumonia of both lungs due to Streptococcus pneumoniae, unspecified part of lung  Shortness of breath: Shortness of breath  Chest tightness: Chest tightness  Pneumonia: Pneumonia

## 2018-12-13 NOTE — PROGRESS NOTE ADULT - SUBJECTIVE AND OBJECTIVE BOX
Interval history and ROS:    Improving but still requiring O2 with ambulation  Arranged from home O2    MEDICATIONS:  ALBUTerol/ipratropium for Nebulization 3 milliLiter(s) Nebulizer every 6 hours  aluminum hydroxide/magnesium hydroxide/simethicone Suspension 30 milliLiter(s) Oral every 6 hours PRN  aspirin 325 milliGRAM(s) Oral daily  bisacodyl 10 milliGRAM(s) Oral every 12 hours PRN  cefpodoxime 200 milliGRAM(s) Oral every 12 hours  chlorhexidine 4% Liquid 1 Application(s) Topical <User Schedule> PRN  enoxaparin Injectable 40 milliGRAM(s) SubCutaneous daily  guaiFENesin  milliGRAM(s) Oral every 12 hours  predniSONE   Tablet 50 milliGRAM(s) Oral daily      VITAL SIGNS:  Vital Signs Last 24 Hrs  T(C): 35.7 (13 Dec 2018 05:44), Max: 36.2 (12 Dec 2018 22:07)  T(F): 96.2 (13 Dec 2018 05:44), Max: 97.1 (12 Dec 2018 22:07)  HR: 42 (13 Dec 2018 05:44) (42 - 60)  BP: 122/58 (13 Dec 2018 05:44) (122/58 - 136/65)  BP(mean): --  RR: 16 (13 Dec 2018 05:44) (16 - 16)  SpO2: 95% (12 Dec 2018 12:03) (86% - 95%)        PHYSICAL EXAM:  Awake and alert NAD  Neck supple, no LN  S1 and S2 no murmur  Lungs are clear without wheeze, rhonchi or rales  Abdomen is soft and non tender  There is no edema  Neurologically non focal

## 2018-12-13 NOTE — PROGRESS NOTE ADULT - ASSESSMENT
IMPRESSION:  59 yo female non smoker with no significant PMH presenting with a 3 week history of exertional dyspnea, recent onset of cough and fever to 101 with elevated WBC and bibasilar opacities on CXR   Community acquired pneumonia due to s. pneumoniae  Gradual improving but still in need of O2 with walking    PLAN:  Complete antibiotics and empiric steroids  OK for DC home with O2 (will likely only be required for a short time)  Outpatient follow up  Repeat CXR in 4 weeks

## 2018-12-13 NOTE — PROGRESS NOTE ADULT - PROVIDER SPECIALTY LIST ADULT
Infectious Disease
Internal Medicine
Pulmonology
Internal Medicine
Pulmonology
Internal Medicine

## 2018-12-17 DIAGNOSIS — R07.89 OTHER CHEST PAIN: ICD-10-CM

## 2018-12-17 DIAGNOSIS — D72.89 OTHER SPECIFIED DISORDERS OF WHITE BLOOD CELLS: ICD-10-CM

## 2018-12-17 DIAGNOSIS — R50.9 FEVER, UNSPECIFIED: ICD-10-CM

## 2018-12-17 DIAGNOSIS — B95.5 UNSPECIFIED STREPTOCOCCUS AS THE CAUSE OF DISEASES CLASSIFIED ELSEWHERE: ICD-10-CM

## 2018-12-17 DIAGNOSIS — Z88.0 ALLERGY STATUS TO PENICILLIN: ICD-10-CM

## 2018-12-17 DIAGNOSIS — J96.01 ACUTE RESPIRATORY FAILURE WITH HYPOXIA: ICD-10-CM

## 2018-12-17 DIAGNOSIS — Z82.49 FAMILY HISTORY OF ISCHEMIC HEART DISEASE AND OTHER DISEASES OF THE CIRCULATORY SYSTEM: ICD-10-CM

## 2018-12-17 DIAGNOSIS — J20.9 ACUTE BRONCHITIS, UNSPECIFIED: ICD-10-CM

## 2018-12-17 DIAGNOSIS — J18.9 PNEUMONIA, UNSPECIFIED ORGANISM: ICD-10-CM

## 2018-12-17 DIAGNOSIS — R06.02 SHORTNESS OF BREATH: ICD-10-CM

## 2019-12-04 ENCOUNTER — OUTPATIENT (OUTPATIENT)
Dept: OUTPATIENT SERVICES | Facility: HOSPITAL | Age: 59
LOS: 1 days | Discharge: HOME | End: 2019-12-04
Payer: COMMERCIAL

## 2019-12-04 DIAGNOSIS — J84.112 IDIOPATHIC PULMONARY FIBROSIS: ICD-10-CM

## 2019-12-04 DIAGNOSIS — Z98.890 OTHER SPECIFIED POSTPROCEDURAL STATES: Chronic | ICD-10-CM

## 2019-12-04 PROCEDURE — 71250 CT THORAX DX C-: CPT | Mod: 26

## 2020-02-25 ENCOUNTER — APPOINTMENT (OUTPATIENT)
Dept: CARDIOLOGY | Facility: CLINIC | Age: 60
End: 2020-02-25
Payer: COMMERCIAL

## 2020-02-25 PROCEDURE — 99204 OFFICE O/P NEW MOD 45 MIN: CPT

## 2020-02-25 PROCEDURE — 93000 ELECTROCARDIOGRAM COMPLETE: CPT

## 2020-03-04 PROBLEM — Z00.00 ENCOUNTER FOR PREVENTIVE HEALTH EXAMINATION: Status: ACTIVE | Noted: 2020-03-04

## 2020-03-09 ENCOUNTER — APPOINTMENT (OUTPATIENT)
Dept: CARDIOLOGY | Facility: CLINIC | Age: 60
End: 2020-03-09
Payer: COMMERCIAL

## 2020-03-09 PROCEDURE — 93306 TTE W/DOPPLER COMPLETE: CPT

## 2020-08-11 NOTE — ED ADULT TRIAGE NOTE - RESPIRATORY RATE (BREATHS/MIN)
----- Message from MIYA Alejandro sent at 8/10/2020  4:24 PM CDT -----  Results were provided to patient by Dr. Sesay at the time of stress completion:  Normal stress echocardiogram.  Normal stress ECG.  Ch treadmill score of >5 indicates good long term prognosis with >99% annual survival rate.  
----- Message from MIYA Alejandro sent at 8/10/2020 10:12 AM CDT -----  Normal left ventricular size, systolic function and wall thickness, with no regional wall motion abnormalities. LVEF 65%.  Normal right ventricular size and systolic function, RVSP 18.5 mmHg.  No significant valve abnormalities.  Normal size aortic root and proximal ascending aorta.  No pericardial effusion.    Stress test today.  
Writer spoke to patient to convey test results as reported below. Patient had no further questions.  
22

## 2022-05-27 NOTE — PROGRESS NOTE ADULT - REASON FOR ADMISSION
58 YEARS OLD FEMALE C/O SOB .
negative...

## 2022-08-18 ENCOUNTER — NON-APPOINTMENT (OUTPATIENT)
Age: 62
End: 2022-08-18

## 2023-08-08 NOTE — PHARMACOTHERAPY INTERVENTION NOTE - COMMENTS
Please advise on finalized lab results.   22-Nov-2021 13:06 PCN allergy - PA Millie aware of penicillin allergy & wants Rocephin given